# Patient Record
Sex: MALE | Race: OTHER | URBAN - METROPOLITAN AREA
[De-identification: names, ages, dates, MRNs, and addresses within clinical notes are randomized per-mention and may not be internally consistent; named-entity substitution may affect disease eponyms.]

---

## 2023-02-13 ENCOUNTER — LAB ENCOUNTER (OUTPATIENT)
Dept: LAB | Age: 53
End: 2023-02-13
Attending: STUDENT IN AN ORGANIZED HEALTH CARE EDUCATION/TRAINING PROGRAM
Payer: COMMERCIAL

## 2023-02-13 ENCOUNTER — OFFICE VISIT (OUTPATIENT)
Dept: FAMILY MEDICINE CLINIC | Facility: CLINIC | Age: 53
End: 2023-02-13

## 2023-02-13 VITALS
SYSTOLIC BLOOD PRESSURE: 115 MMHG | DIASTOLIC BLOOD PRESSURE: 74 MMHG | WEIGHT: 157 LBS | BODY MASS INDEX: 22.48 KG/M2 | HEART RATE: 76 BPM | HEIGHT: 69.9 IN

## 2023-02-13 DIAGNOSIS — R10.11 RUQ PAIN: ICD-10-CM

## 2023-02-13 DIAGNOSIS — Z00.00 WELL ADULT EXAM: ICD-10-CM

## 2023-02-13 DIAGNOSIS — R10.11 RUQ PAIN: Primary | ICD-10-CM

## 2023-02-13 LAB
ALBUMIN SERPL-MCNC: 4.1 G/DL (ref 3.4–5)
ALBUMIN/GLOB SERPL: 1.1 {RATIO} (ref 1–2)
ALP LIVER SERPL-CCNC: 89 U/L
ALT SERPL-CCNC: 24 U/L
ANION GAP SERPL CALC-SCNC: 6 MMOL/L (ref 0–18)
AST SERPL-CCNC: 13 U/L (ref 15–37)
BILIRUB SERPL-MCNC: 0.7 MG/DL (ref 0.1–2)
BILIRUB UR QL: NEGATIVE
BUN BLD-MCNC: 13 MG/DL (ref 7–18)
BUN/CREAT SERPL: 14.1 (ref 10–20)
CALCIUM BLD-MCNC: 9.1 MG/DL (ref 8.5–10.1)
CHLORIDE SERPL-SCNC: 107 MMOL/L (ref 98–112)
CHOLEST SERPL-MCNC: 150 MG/DL (ref ?–200)
CLARITY UR: CLEAR
CO2 SERPL-SCNC: 27 MMOL/L (ref 21–32)
COLOR UR: YELLOW
COMPLEXED PSA SERPL-MCNC: 1.01 NG/ML (ref ?–4)
CREAT BLD-MCNC: 0.92 MG/DL
DEPRECATED RDW RBC AUTO: 41.1 FL (ref 35.1–46.3)
ERYTHROCYTE [DISTWIDTH] IN BLOOD BY AUTOMATED COUNT: 12.4 % (ref 11–15)
FASTING PATIENT LIPID ANSWER: NO
FASTING STATUS PATIENT QL REPORTED: NO
GFR SERPLBLD BASED ON 1.73 SQ M-ARVRAT: 101 ML/MIN/1.73M2 (ref 60–?)
GLOBULIN PLAS-MCNC: 3.6 G/DL (ref 2.8–4.4)
GLUCOSE BLD-MCNC: 91 MG/DL (ref 70–99)
GLUCOSE UR-MCNC: NEGATIVE MG/DL
HCT VFR BLD AUTO: 45.9 %
HDLC SERPL-MCNC: 51 MG/DL (ref 40–59)
HGB BLD-MCNC: 15.8 G/DL
KETONES UR-MCNC: NEGATIVE MG/DL
LDLC SERPL CALC-MCNC: 86 MG/DL (ref ?–100)
LEUKOCYTE ESTERASE UR QL STRIP.AUTO: NEGATIVE
LIPASE SERPL-CCNC: 147 U/L (ref 73–393)
LIPASE SERPL-CCNC: 33 U/L (ref 13–75)
MCH RBC QN AUTO: 31 PG (ref 26–34)
MCHC RBC AUTO-ENTMCNC: 34.4 G/DL (ref 31–37)
MCV RBC AUTO: 90.2 FL
NITRITE UR QL STRIP.AUTO: NEGATIVE
NONHDLC SERPL-MCNC: 99 MG/DL (ref ?–130)
OSMOLALITY SERPL CALC.SUM OF ELEC: 290 MOSM/KG (ref 275–295)
PH UR: 6.5 [PH] (ref 5–8)
PLATELET # BLD AUTO: 280 10(3)UL (ref 150–450)
POTASSIUM SERPL-SCNC: 3.8 MMOL/L (ref 3.5–5.1)
PROT SERPL-MCNC: 7.7 G/DL (ref 6.4–8.2)
PROT UR-MCNC: NEGATIVE MG/DL
RBC # BLD AUTO: 5.09 X10(6)UL
SODIUM SERPL-SCNC: 140 MMOL/L (ref 136–145)
SP GR UR STRIP: 1.02 (ref 1–1.03)
TRIGL SERPL-MCNC: 66 MG/DL (ref 30–149)
TSI SER-ACNC: 1.48 MIU/ML (ref 0.36–3.74)
UROBILINOGEN UR STRIP-ACNC: 1
VLDLC SERPL CALC-MCNC: 10 MG/DL (ref 0–30)
WBC # BLD AUTO: 6.7 X10(3) UL (ref 4–11)

## 2023-02-13 PROCEDURE — 81015 MICROSCOPIC EXAM OF URINE: CPT

## 2023-02-13 PROCEDURE — 85027 COMPLETE CBC AUTOMATED: CPT

## 2023-02-13 PROCEDURE — 80053 COMPREHEN METABOLIC PANEL: CPT

## 2023-02-13 PROCEDURE — 83690 ASSAY OF LIPASE: CPT

## 2023-02-13 PROCEDURE — 84443 ASSAY THYROID STIM HORMONE: CPT

## 2023-02-13 PROCEDURE — 80061 LIPID PANEL: CPT

## 2023-02-13 PROCEDURE — 36415 COLL VENOUS BLD VENIPUNCTURE: CPT

## 2023-02-13 PROCEDURE — 81001 URINALYSIS AUTO W/SCOPE: CPT

## 2023-02-13 RX ORDER — TRAMADOL HYDROCHLORIDE 50 MG/1
50 TABLET ORAL EVERY 6 HOURS PRN
Qty: 30 TABLET | Refills: 1 | Status: SHIPPED | OUTPATIENT
Start: 2023-02-13

## 2023-02-22 ENCOUNTER — PATIENT MESSAGE (OUTPATIENT)
Dept: FAMILY MEDICINE CLINIC | Facility: CLINIC | Age: 53
End: 2023-02-22

## 2023-02-22 ENCOUNTER — MED REC SCAN ONLY (OUTPATIENT)
Dept: FAMILY MEDICINE CLINIC | Facility: CLINIC | Age: 53
End: 2023-02-22

## 2023-02-22 DIAGNOSIS — N28.9 RENAL LESION: Primary | ICD-10-CM

## 2023-03-03 ENCOUNTER — TELEPHONE (OUTPATIENT)
Dept: FAMILY MEDICINE CLINIC | Facility: CLINIC | Age: 53
End: 2023-03-03

## 2023-03-03 NOTE — TELEPHONE ENCOUNTER
Faxed CT Abdomen to [facility] per patients request.    Notified patient using Italian language line  Nicole # [id number]

## 2023-03-03 NOTE — TELEPHONE ENCOUNTER
Pt states that he has an appointment with [facility] tomorrow at 11:30. Pt would like confirm that the order/ct abdomen was sent to them. Please, see previous encounter of 2-22-23. Pt would like a call back today  in Maltese to confirm that it was faxed.

## 2023-03-13 DIAGNOSIS — R10.11 RUQ PAIN: ICD-10-CM

## 2023-03-14 NOTE — TELEPHONE ENCOUNTER
Please review refill protocol failed/ no protocol  Requested Prescriptions   Pending Prescriptions Disp Refills    traMADol 50 MG Oral Tab 30 tablet 1     Sig: Take 1 tablet (50 mg total) by mouth every 6 (six) hours as needed for Pain.        There is no refill protocol information for this order

## 2023-03-16 RX ORDER — TRAMADOL HYDROCHLORIDE 50 MG/1
50 TABLET ORAL EVERY 6 HOURS PRN
Qty: 30 TABLET | Refills: 1 | Status: SHIPPED | OUTPATIENT
Start: 2023-03-16

## 2023-03-22 ENCOUNTER — MED REC SCAN ONLY (OUTPATIENT)
Dept: FAMILY MEDICINE CLINIC | Facility: CLINIC | Age: 53
End: 2023-03-22

## 2023-03-27 ENCOUNTER — OFFICE VISIT (OUTPATIENT)
Dept: FAMILY MEDICINE CLINIC | Facility: CLINIC | Age: 53
End: 2023-03-27

## 2023-03-27 VITALS
WEIGHT: 152 LBS | SYSTOLIC BLOOD PRESSURE: 127 MMHG | BODY MASS INDEX: 21.76 KG/M2 | HEART RATE: 76 BPM | DIASTOLIC BLOOD PRESSURE: 80 MMHG | HEIGHT: 69.9 IN | TEMPERATURE: 98 F

## 2023-03-27 DIAGNOSIS — S33.5XXA SPRAIN OF LOW BACK, INITIAL ENCOUNTER: Primary | ICD-10-CM

## 2023-03-27 PROCEDURE — 3008F BODY MASS INDEX DOCD: CPT | Performed by: STUDENT IN AN ORGANIZED HEALTH CARE EDUCATION/TRAINING PROGRAM

## 2023-03-27 PROCEDURE — 99213 OFFICE O/P EST LOW 20 MIN: CPT | Performed by: STUDENT IN AN ORGANIZED HEALTH CARE EDUCATION/TRAINING PROGRAM

## 2023-03-27 PROCEDURE — 3079F DIAST BP 80-89 MM HG: CPT | Performed by: STUDENT IN AN ORGANIZED HEALTH CARE EDUCATION/TRAINING PROGRAM

## 2023-03-27 PROCEDURE — 3074F SYST BP LT 130 MM HG: CPT | Performed by: STUDENT IN AN ORGANIZED HEALTH CARE EDUCATION/TRAINING PROGRAM

## 2023-03-27 RX ORDER — ORPHENADRINE CITRATE 100 MG/1
100 TABLET, EXTENDED RELEASE ORAL
COMMUNITY
Start: 2023-03-22 | End: 2023-03-27

## 2023-03-27 RX ORDER — KETOROLAC TROMETHAMINE 10 MG/1
TABLET, FILM COATED ORAL
COMMUNITY
Start: 2023-03-22

## 2023-04-17 ENCOUNTER — TELEPHONE (OUTPATIENT)
Dept: PHYSICAL THERAPY | Facility: HOSPITAL | Age: 53
End: 2023-04-17

## 2023-04-19 ENCOUNTER — OFFICE VISIT (OUTPATIENT)
Dept: PHYSICAL THERAPY | Facility: HOSPITAL | Age: 53
End: 2023-04-19
Attending: STUDENT IN AN ORGANIZED HEALTH CARE EDUCATION/TRAINING PROGRAM
Payer: COMMERCIAL

## 2023-04-19 DIAGNOSIS — S33.5XXA SPRAIN OF LOW BACK, INITIAL ENCOUNTER: ICD-10-CM

## 2023-04-19 PROCEDURE — 97161 PT EVAL LOW COMPLEX 20 MIN: CPT

## 2023-04-19 PROCEDURE — 97110 THERAPEUTIC EXERCISES: CPT

## 2023-04-25 ENCOUNTER — OFFICE VISIT (OUTPATIENT)
Dept: PHYSICAL THERAPY | Facility: HOSPITAL | Age: 53
End: 2023-04-25
Attending: STUDENT IN AN ORGANIZED HEALTH CARE EDUCATION/TRAINING PROGRAM
Payer: COMMERCIAL

## 2023-04-25 PROCEDURE — 97110 THERAPEUTIC EXERCISES: CPT

## 2023-04-25 PROCEDURE — 97140 MANUAL THERAPY 1/> REGIONS: CPT

## 2023-04-25 PROCEDURE — 97112 NEUROMUSCULAR REEDUCATION: CPT

## 2023-04-27 ENCOUNTER — OFFICE VISIT (OUTPATIENT)
Dept: PHYSICAL THERAPY | Facility: HOSPITAL | Age: 53
End: 2023-04-27
Attending: STUDENT IN AN ORGANIZED HEALTH CARE EDUCATION/TRAINING PROGRAM
Payer: COMMERCIAL

## 2023-04-27 PROCEDURE — 97140 MANUAL THERAPY 1/> REGIONS: CPT

## 2023-04-27 PROCEDURE — 97110 THERAPEUTIC EXERCISES: CPT

## 2023-05-01 ENCOUNTER — OFFICE VISIT (OUTPATIENT)
Dept: PHYSICAL THERAPY | Facility: HOSPITAL | Age: 53
End: 2023-05-01
Attending: STUDENT IN AN ORGANIZED HEALTH CARE EDUCATION/TRAINING PROGRAM
Payer: COMMERCIAL

## 2023-05-01 PROCEDURE — 97112 NEUROMUSCULAR REEDUCATION: CPT

## 2023-05-01 PROCEDURE — 97110 THERAPEUTIC EXERCISES: CPT

## 2023-05-03 ENCOUNTER — OFFICE VISIT (OUTPATIENT)
Dept: PHYSICAL THERAPY | Facility: HOSPITAL | Age: 53
End: 2023-05-03
Attending: STUDENT IN AN ORGANIZED HEALTH CARE EDUCATION/TRAINING PROGRAM
Payer: COMMERCIAL

## 2023-05-03 PROCEDURE — 97112 NEUROMUSCULAR REEDUCATION: CPT

## 2023-05-03 PROCEDURE — 97110 THERAPEUTIC EXERCISES: CPT

## 2023-05-08 ENCOUNTER — OFFICE VISIT (OUTPATIENT)
Dept: PHYSICAL THERAPY | Facility: HOSPITAL | Age: 53
End: 2023-05-08
Attending: STUDENT IN AN ORGANIZED HEALTH CARE EDUCATION/TRAINING PROGRAM
Payer: COMMERCIAL

## 2023-05-08 ENCOUNTER — TELEPHONE (OUTPATIENT)
Dept: PHYSICAL THERAPY | Facility: HOSPITAL | Age: 53
End: 2023-05-08

## 2023-05-08 PROCEDURE — 97112 NEUROMUSCULAR REEDUCATION: CPT

## 2023-05-08 PROCEDURE — 97110 THERAPEUTIC EXERCISES: CPT

## 2023-05-10 ENCOUNTER — OFFICE VISIT (OUTPATIENT)
Dept: PHYSICAL THERAPY | Facility: HOSPITAL | Age: 53
End: 2023-05-10
Attending: STUDENT IN AN ORGANIZED HEALTH CARE EDUCATION/TRAINING PROGRAM
Payer: COMMERCIAL

## 2023-05-10 PROCEDURE — 97110 THERAPEUTIC EXERCISES: CPT

## 2023-05-10 PROCEDURE — 97112 NEUROMUSCULAR REEDUCATION: CPT

## 2023-05-22 ENCOUNTER — OFFICE VISIT (OUTPATIENT)
Dept: FAMILY MEDICINE CLINIC | Facility: CLINIC | Age: 53
End: 2023-05-22

## 2023-05-22 VITALS
HEIGHT: 69.9 IN | TEMPERATURE: 98 F | SYSTOLIC BLOOD PRESSURE: 113 MMHG | HEART RATE: 62 BPM | WEIGHT: 159.19 LBS | DIASTOLIC BLOOD PRESSURE: 74 MMHG | OXYGEN SATURATION: 97 % | BODY MASS INDEX: 22.79 KG/M2

## 2023-05-22 DIAGNOSIS — S33.5XXA SPRAIN OF LOW BACK, INITIAL ENCOUNTER: ICD-10-CM

## 2023-05-22 DIAGNOSIS — Z12.11 COLON CANCER SCREENING: ICD-10-CM

## 2023-05-22 DIAGNOSIS — Z00.00 WELL ADULT EXAM: Primary | ICD-10-CM

## 2023-05-22 PROCEDURE — 99396 PREV VISIT EST AGE 40-64: CPT | Performed by: STUDENT IN AN ORGANIZED HEALTH CARE EDUCATION/TRAINING PROGRAM

## 2023-05-22 PROCEDURE — 3074F SYST BP LT 130 MM HG: CPT | Performed by: STUDENT IN AN ORGANIZED HEALTH CARE EDUCATION/TRAINING PROGRAM

## 2023-05-22 PROCEDURE — 3008F BODY MASS INDEX DOCD: CPT | Performed by: STUDENT IN AN ORGANIZED HEALTH CARE EDUCATION/TRAINING PROGRAM

## 2023-05-22 PROCEDURE — 3078F DIAST BP <80 MM HG: CPT | Performed by: STUDENT IN AN ORGANIZED HEALTH CARE EDUCATION/TRAINING PROGRAM

## 2023-07-12 ENCOUNTER — OFFICE VISIT (OUTPATIENT)
Dept: FAMILY MEDICINE CLINIC | Facility: CLINIC | Age: 53
End: 2023-07-12

## 2023-07-12 VITALS
SYSTOLIC BLOOD PRESSURE: 115 MMHG | BODY MASS INDEX: 24.26 KG/M2 | HEART RATE: 69 BPM | DIASTOLIC BLOOD PRESSURE: 79 MMHG | OXYGEN SATURATION: 98 % | TEMPERATURE: 97 F | WEIGHT: 163.81 LBS | HEIGHT: 69 IN

## 2023-07-12 DIAGNOSIS — S33.5XXA SPRAIN OF LOW BACK, INITIAL ENCOUNTER: Primary | ICD-10-CM

## 2023-07-12 DIAGNOSIS — R10.11 RUQ PAIN: ICD-10-CM

## 2023-07-12 PROCEDURE — 99213 OFFICE O/P EST LOW 20 MIN: CPT | Performed by: STUDENT IN AN ORGANIZED HEALTH CARE EDUCATION/TRAINING PROGRAM

## 2023-07-12 RX ORDER — METHOCARBAMOL 750 MG/1
750 TABLET, FILM COATED ORAL 4 TIMES DAILY PRN
Qty: 60 TABLET | Refills: 0 | Status: SHIPPED | OUTPATIENT
Start: 2023-07-12

## 2023-07-12 RX ORDER — TRAMADOL HYDROCHLORIDE 50 MG/1
50 TABLET ORAL EVERY 6 HOURS PRN
Qty: 30 TABLET | Refills: 1 | Status: SHIPPED | OUTPATIENT
Start: 2023-07-12

## 2024-02-22 ENCOUNTER — OFFICE VISIT (OUTPATIENT)
Dept: FAMILY MEDICINE CLINIC | Facility: CLINIC | Age: 54
End: 2024-02-22
Payer: COMMERCIAL

## 2024-02-22 VITALS
HEART RATE: 66 BPM | HEIGHT: 69 IN | WEIGHT: 172 LBS | SYSTOLIC BLOOD PRESSURE: 128 MMHG | BODY MASS INDEX: 25.48 KG/M2 | OXYGEN SATURATION: 100 % | DIASTOLIC BLOOD PRESSURE: 84 MMHG

## 2024-02-22 DIAGNOSIS — H11.31 SCLERAL HEMORRHAGE OF RIGHT EYE: Primary | ICD-10-CM

## 2024-02-22 PROCEDURE — 3008F BODY MASS INDEX DOCD: CPT | Performed by: FAMILY MEDICINE

## 2024-02-22 PROCEDURE — 3074F SYST BP LT 130 MM HG: CPT | Performed by: FAMILY MEDICINE

## 2024-02-22 PROCEDURE — 3079F DIAST BP 80-89 MM HG: CPT | Performed by: FAMILY MEDICINE

## 2024-02-22 PROCEDURE — 99213 OFFICE O/P EST LOW 20 MIN: CPT | Performed by: FAMILY MEDICINE

## 2024-02-22 NOTE — PROGRESS NOTES
Subjective:   Dada Pratt is a 54 year old male who presents for Redness (Right eye redness that appeared Friday after 2:00PM after a coworker noticed it. Pt denies any pain. ) no trauma no vision change      History/Other:    Chief Complaint Reviewed and Verified  Nursing Notes Reviewed and   Verified  Tobacco Reviewed  Allergies Reviewed  Problem List Reviewed    Medical History Reviewed  Surgical History Reviewed  Family History   Reviewed  Social History Reviewed         Tobacco:  He smoked tobacco in the past but quit greater than 12 months ago.  Social History    Tobacco Use      Smoking status: Former        Packs/day: 0.50        Years: 14.00        Additional pack years: 0.00        Total pack years: 7.00        Types: Cigarettes        Quit date: 12/15/2008        Years since quitting: 15.1        Passive exposure: Never      Smokeless tobacco: Never       Current Outpatient Medications   Medication Sig Dispense Refill    methocarbamol 750 MG Oral Tab Take 1 tablet (750 mg total) by mouth 4 (four) times daily as needed (pain). (Patient not taking: Reported on 2/22/2024) 60 tablet 0    traMADol 50 MG Oral Tab Take 1 tablet (50 mg total) by mouth every 6 (six) hours as needed for Pain. (Patient not taking: Reported on 2/22/2024) 30 tablet 1    Ketorolac Tromethamine 10 MG Oral Tab TAKE 1 TABLET BY MOUTH EVERY 6 HOURS FOR UP TO 5 DAYS AS NEEDED FOR PAIN (Patient not taking: Reported on 5/22/2023)           Review of Systems:  Review of Systems   Constitutional: Negative.    Eyes:  Positive for redness. Negative for photophobia, pain, discharge, itching and visual disturbance.         Objective:   /84   Pulse 66   Ht 5' 9\" (1.753 m)   Wt 172 lb (78 kg)   SpO2 100%   BMI 25.40 kg/m²  Estimated body mass index is 25.4 kg/m² as calculated from the following:    Height as of this encounter: 5' 9\" (1.753 m).    Weight as of this encounter: 172 lb (78 kg).  Physical Exam  Vitals reviewed.    Constitutional:       Appearance: Normal appearance.   Eyes:      General:         Right eye: No discharge.      Slit lamp exam:     Right eye: Anterior chamber quiet.      Comments: Right scleral hemorrhage.     Neurological:      Mental Status: He is alert.           Assessment & Plan:   1. Scleral hemorrhage of right eye (Primary)    Assurance provided that this should heal without treatment.       No follow-ups on file.    Adelso Gonzalez DO, 2/22/2024, 12:08 PM

## 2024-06-07 ENCOUNTER — TELEPHONE (OUTPATIENT)
Dept: FAMILY MEDICINE CLINIC | Facility: CLINIC | Age: 54
End: 2024-06-07

## 2024-08-05 ENCOUNTER — HOSPITAL ENCOUNTER (INPATIENT)
Facility: HOSPITAL | Age: 54
LOS: 1 days | Discharge: HOME | End: 2024-08-07
Attending: EMERGENCY MEDICINE | Admitting: STUDENT IN AN ORGANIZED HEALTH CARE EDUCATION/TRAINING PROGRAM
Payer: COMMERCIAL

## 2024-08-05 ENCOUNTER — APPOINTMENT (OUTPATIENT)
Dept: CT IMAGING | Facility: HOSPITAL | Age: 54
End: 2024-08-05
Attending: EMERGENCY MEDICINE
Payer: COMMERCIAL

## 2024-08-05 DIAGNOSIS — A41.9 SEVERE SEPSIS (HCC): ICD-10-CM

## 2024-08-05 DIAGNOSIS — R65.20 SEVERE SEPSIS (HCC): ICD-10-CM

## 2024-08-05 DIAGNOSIS — K37 APPENDICITIS: ICD-10-CM

## 2024-08-05 DIAGNOSIS — K35.30 ACUTE APPENDICITIS WITH LOCALIZED PERITONITIS, WITHOUT PERFORATION, ABSCESS, OR GANGRENE: Primary | ICD-10-CM

## 2024-08-05 DIAGNOSIS — K37 APPENDICITIS: Primary | ICD-10-CM

## 2024-08-05 LAB
ALBUMIN SERPL-MCNC: 4.5 G/DL (ref 3.2–4.8)
ALBUMIN/GLOB SERPL: 1.5 {RATIO} (ref 1–2)
ALP LIVER SERPL-CCNC: 83 U/L
ALT SERPL-CCNC: 17 U/L
ANION GAP SERPL CALC-SCNC: 8 MMOL/L (ref 0–18)
AST SERPL-CCNC: 22 U/L (ref ?–34)
BASOPHILS # BLD AUTO: 0.04 X10(3) UL (ref 0–0.2)
BASOPHILS NFR BLD AUTO: 0.3 %
BILIRUB SERPL-MCNC: 1.1 MG/DL (ref 0.3–1.2)
BILIRUB UR QL: NEGATIVE
BUN BLD-MCNC: 8 MG/DL (ref 9–23)
BUN/CREAT SERPL: 8.5 (ref 10–20)
CALCIUM BLD-MCNC: 9.5 MG/DL (ref 8.7–10.4)
CHLORIDE SERPL-SCNC: 107 MMOL/L (ref 98–112)
CLARITY UR: CLEAR
CO2 SERPL-SCNC: 23 MMOL/L (ref 21–32)
COLOR UR: COLORLESS
CREAT BLD-MCNC: 0.94 MG/DL
DEPRECATED RDW RBC AUTO: 42.2 FL (ref 35.1–46.3)
EGFRCR SERPLBLD CKD-EPI 2021: 98 ML/MIN/1.73M2 (ref 60–?)
EOSINOPHIL # BLD AUTO: 0.06 X10(3) UL (ref 0–0.7)
EOSINOPHIL NFR BLD AUTO: 0.4 %
ERYTHROCYTE [DISTWIDTH] IN BLOOD BY AUTOMATED COUNT: 12.6 % (ref 11–15)
GLOBULIN PLAS-MCNC: 3 G/DL (ref 2–3.5)
GLUCOSE BLD-MCNC: 116 MG/DL (ref 70–99)
GLUCOSE UR-MCNC: NORMAL MG/DL
HCT VFR BLD AUTO: 44.4 %
HGB BLD-MCNC: 15.4 G/DL
HGB UR QL STRIP.AUTO: NEGATIVE
IMM GRANULOCYTES # BLD AUTO: 0.05 X10(3) UL (ref 0–1)
IMM GRANULOCYTES NFR BLD: 0.4 %
KETONES UR-MCNC: 10 MG/DL
LACTATE SERPL-SCNC: 1.7 MMOL/L (ref 0.5–2)
LEUKOCYTE ESTERASE UR QL STRIP.AUTO: NEGATIVE
LYMPHOCYTES # BLD AUTO: 1.64 X10(3) UL (ref 1–4)
LYMPHOCYTES NFR BLD AUTO: 12.2 %
MAGNESIUM SERPL-MCNC: 2 MG/DL (ref 1.6–2.6)
MCH RBC QN AUTO: 31.6 PG (ref 26–34)
MCHC RBC AUTO-ENTMCNC: 34.7 G/DL (ref 31–37)
MCV RBC AUTO: 91.2 FL
MONOCYTES # BLD AUTO: 0.85 X10(3) UL (ref 0.1–1)
MONOCYTES NFR BLD AUTO: 6.3 %
NEUTROPHILS # BLD AUTO: 10.79 X10 (3) UL (ref 1.5–7.7)
NEUTROPHILS # BLD AUTO: 10.79 X10(3) UL (ref 1.5–7.7)
NEUTROPHILS NFR BLD AUTO: 80.4 %
NITRITE UR QL STRIP.AUTO: NEGATIVE
OSMOLALITY SERPL CALC.SUM OF ELEC: 285 MOSM/KG (ref 275–295)
PH UR: 6.5 [PH] (ref 5–8)
PLATELET # BLD AUTO: 242 10(3)UL (ref 150–450)
POTASSIUM SERPL-SCNC: 3.9 MMOL/L (ref 3.5–5.1)
PROT SERPL-MCNC: 7.5 G/DL (ref 5.7–8.2)
PROT UR-MCNC: NEGATIVE MG/DL
RBC # BLD AUTO: 4.87 X10(6)UL
SODIUM SERPL-SCNC: 138 MMOL/L (ref 136–145)
SP GR UR STRIP: 1.02 (ref 1–1.03)
UROBILINOGEN UR STRIP-ACNC: NORMAL
WBC # BLD AUTO: 13.4 X10(3) UL (ref 4–11)

## 2024-08-05 PROCEDURE — 99223 1ST HOSP IP/OBS HIGH 75: CPT | Performed by: STUDENT IN AN ORGANIZED HEALTH CARE EDUCATION/TRAINING PROGRAM

## 2024-08-05 PROCEDURE — 74177 CT ABD & PELVIS W/CONTRAST: CPT | Performed by: EMERGENCY MEDICINE

## 2024-08-05 RX ORDER — MORPHINE SULFATE 4 MG/ML
4 INJECTION, SOLUTION INTRAMUSCULAR; INTRAVENOUS ONCE
Status: COMPLETED | OUTPATIENT
Start: 2024-08-05 | End: 2024-08-05

## 2024-08-05 NOTE — ED INITIAL ASSESSMENT (HPI)
Pt c/o right lower abd pain that is radiating to right flank that started yesterday night. Pt denies N/V/D, fever, cough and urinary sx.

## 2024-08-06 ENCOUNTER — ANESTHESIA (OUTPATIENT)
Dept: SURGERY | Facility: HOSPITAL | Age: 54
End: 2024-08-06
Payer: COMMERCIAL

## 2024-08-06 ENCOUNTER — ANESTHESIA EVENT (OUTPATIENT)
Dept: SURGERY | Facility: HOSPITAL | Age: 54
End: 2024-08-06
Payer: COMMERCIAL

## 2024-08-06 PROBLEM — A41.9 SEVERE SEPSIS (HCC): Status: ACTIVE | Noted: 2024-08-06

## 2024-08-06 PROBLEM — K35.30 ACUTE APPENDICITIS WITH LOCALIZED PERITONITIS, WITHOUT PERFORATION, ABSCESS, OR GANGRENE: Status: ACTIVE | Noted: 2024-08-06

## 2024-08-06 PROBLEM — K35.32 ACUTE APPENDICITIS WITH PERFORATION, LOCALIZED PERITONITIS, AND GANGRENE, WITHOUT ABSCESS: Status: ACTIVE | Noted: 2024-08-05

## 2024-08-06 PROBLEM — R65.20 SEVERE SEPSIS (HCC): Status: ACTIVE | Noted: 2024-08-06

## 2024-08-06 LAB
ANION GAP SERPL CALC-SCNC: 5 MMOL/L (ref 0–18)
ATRIAL RATE: 78 BPM
BASOPHILS # BLD AUTO: 0.03 X10(3) UL (ref 0–0.2)
BASOPHILS NFR BLD AUTO: 0.2 %
BUN BLD-MCNC: 7 MG/DL (ref 9–23)
BUN/CREAT SERPL: 8 (ref 10–20)
CALCIUM BLD-MCNC: 8.7 MG/DL (ref 8.7–10.4)
CHLORIDE SERPL-SCNC: 108 MMOL/L (ref 98–112)
CO2 SERPL-SCNC: 25 MMOL/L (ref 21–32)
CREAT BLD-MCNC: 0.88 MG/DL
DEPRECATED RDW RBC AUTO: 41.7 FL (ref 35.1–46.3)
EGFRCR SERPLBLD CKD-EPI 2021: 103 ML/MIN/1.73M2 (ref 60–?)
EOSINOPHIL # BLD AUTO: 0.01 X10(3) UL (ref 0–0.7)
EOSINOPHIL NFR BLD AUTO: 0.1 %
ERYTHROCYTE [DISTWIDTH] IN BLOOD BY AUTOMATED COUNT: 12.7 % (ref 11–15)
GLUCOSE BLD-MCNC: 120 MG/DL (ref 70–99)
HCT VFR BLD AUTO: 40.9 %
HGB BLD-MCNC: 14 G/DL
IMM GRANULOCYTES # BLD AUTO: 0.05 X10(3) UL (ref 0–1)
IMM GRANULOCYTES NFR BLD: 0.4 %
LYMPHOCYTES # BLD AUTO: 0.64 X10(3) UL (ref 1–4)
LYMPHOCYTES NFR BLD AUTO: 4.9 %
MCH RBC QN AUTO: 31.1 PG (ref 26–34)
MCHC RBC AUTO-ENTMCNC: 34.2 G/DL (ref 31–37)
MCV RBC AUTO: 90.9 FL
MONOCYTES # BLD AUTO: 0.69 X10(3) UL (ref 0.1–1)
MONOCYTES NFR BLD AUTO: 5.3 %
NEUTROPHILS # BLD AUTO: 11.66 X10 (3) UL (ref 1.5–7.7)
NEUTROPHILS # BLD AUTO: 11.66 X10(3) UL (ref 1.5–7.7)
NEUTROPHILS NFR BLD AUTO: 89.1 %
OSMOLALITY SERPL CALC.SUM OF ELEC: 285 MOSM/KG (ref 275–295)
P AXIS: 72 DEGREES
P-R INTERVAL: 152 MS
PLATELET # BLD AUTO: 201 10(3)UL (ref 150–450)
POTASSIUM SERPL-SCNC: 4.2 MMOL/L (ref 3.5–5.1)
Q-T INTERVAL: 384 MS
QRS DURATION: 78 MS
QTC CALCULATION (BEZET): 437 MS
R AXIS: 30 DEGREES
RBC # BLD AUTO: 4.5 X10(6)UL
SODIUM SERPL-SCNC: 138 MMOL/L (ref 136–145)
T AXIS: 63 DEGREES
VENTRICULAR RATE: 78 BPM
WBC # BLD AUTO: 13.1 X10(3) UL (ref 4–11)

## 2024-08-06 PROCEDURE — 0DTJ4ZZ RESECTION OF APPENDIX, PERCUTANEOUS ENDOSCOPIC APPROACH: ICD-10-PCS | Performed by: SURGERY

## 2024-08-06 PROCEDURE — 99254 IP/OBS CNSLTJ NEW/EST MOD 60: CPT | Performed by: SURGERY

## 2024-08-06 PROCEDURE — 99233 SBSQ HOSP IP/OBS HIGH 50: CPT | Performed by: INTERNAL MEDICINE

## 2024-08-06 PROCEDURE — 44970 LAPAROSCOPY APPENDECTOMY: CPT | Performed by: SURGERY

## 2024-08-06 RX ORDER — MORPHINE SULFATE 10 MG/ML
6 INJECTION, SOLUTION INTRAMUSCULAR; INTRAVENOUS EVERY 10 MIN PRN
Status: DISCONTINUED | OUTPATIENT
Start: 2024-08-06 | End: 2024-08-06 | Stop reason: HOSPADM

## 2024-08-06 RX ORDER — SODIUM PHOSPHATE, DIBASIC AND SODIUM PHOSPHATE, MONOBASIC 7; 19 G/230ML; G/230ML
1 ENEMA RECTAL ONCE AS NEEDED
Status: DISCONTINUED | OUTPATIENT
Start: 2024-08-06 | End: 2024-08-07

## 2024-08-06 RX ORDER — DEXAMETHASONE SODIUM PHOSPHATE 4 MG/ML
VIAL (ML) INJECTION AS NEEDED
Status: DISCONTINUED | OUTPATIENT
Start: 2024-08-06 | End: 2024-08-06 | Stop reason: SURG

## 2024-08-06 RX ORDER — MORPHINE SULFATE 2 MG/ML
2 INJECTION, SOLUTION INTRAMUSCULAR; INTRAVENOUS EVERY 2 HOUR PRN
Status: DISCONTINUED | OUTPATIENT
Start: 2024-08-06 | End: 2024-08-07

## 2024-08-06 RX ORDER — NEOSTIGMINE METHYLSULFATE 1 MG/ML
INJECTION INTRAVENOUS AS NEEDED
Status: DISCONTINUED | OUTPATIENT
Start: 2024-08-06 | End: 2024-08-06 | Stop reason: SURG

## 2024-08-06 RX ORDER — POLYETHYLENE GLYCOL 3350 17 G/17G
17 POWDER, FOR SOLUTION ORAL DAILY PRN
Status: DISCONTINUED | OUTPATIENT
Start: 2024-08-06 | End: 2024-08-07

## 2024-08-06 RX ORDER — ROCURONIUM BROMIDE 10 MG/ML
INJECTION, SOLUTION INTRAVENOUS AS NEEDED
Status: DISCONTINUED | OUTPATIENT
Start: 2024-08-06 | End: 2024-08-06 | Stop reason: SURG

## 2024-08-06 RX ORDER — HYDROMORPHONE HYDROCHLORIDE 1 MG/ML
0.6 INJECTION, SOLUTION INTRAMUSCULAR; INTRAVENOUS; SUBCUTANEOUS EVERY 5 MIN PRN
Status: DISCONTINUED | OUTPATIENT
Start: 2024-08-06 | End: 2024-08-06 | Stop reason: HOSPADM

## 2024-08-06 RX ORDER — HYDROMORPHONE HYDROCHLORIDE 1 MG/ML
0.4 INJECTION, SOLUTION INTRAMUSCULAR; INTRAVENOUS; SUBCUTANEOUS EVERY 5 MIN PRN
Status: DISCONTINUED | OUTPATIENT
Start: 2024-08-06 | End: 2024-08-06 | Stop reason: HOSPADM

## 2024-08-06 RX ORDER — PROCHLORPERAZINE EDISYLATE 5 MG/ML
5 INJECTION INTRAMUSCULAR; INTRAVENOUS EVERY 8 HOURS PRN
Status: DISCONTINUED | OUTPATIENT
Start: 2024-08-06 | End: 2024-08-07

## 2024-08-06 RX ORDER — HYDROMORPHONE HYDROCHLORIDE 1 MG/ML
0.4 INJECTION, SOLUTION INTRAMUSCULAR; INTRAVENOUS; SUBCUTANEOUS EVERY 2 HOUR PRN
Status: DISCONTINUED | OUTPATIENT
Start: 2024-08-06 | End: 2024-08-07

## 2024-08-06 RX ORDER — MORPHINE SULFATE 4 MG/ML
4 INJECTION, SOLUTION INTRAMUSCULAR; INTRAVENOUS EVERY 10 MIN PRN
Status: DISCONTINUED | OUTPATIENT
Start: 2024-08-06 | End: 2024-08-06 | Stop reason: HOSPADM

## 2024-08-06 RX ORDER — HYDROMORPHONE HYDROCHLORIDE 1 MG/ML
0.8 INJECTION, SOLUTION INTRAMUSCULAR; INTRAVENOUS; SUBCUTANEOUS EVERY 2 HOUR PRN
Status: DISCONTINUED | OUTPATIENT
Start: 2024-08-06 | End: 2024-08-07

## 2024-08-06 RX ORDER — SENNOSIDES 8.6 MG
17.2 TABLET ORAL NIGHTLY PRN
Status: DISCONTINUED | OUTPATIENT
Start: 2024-08-06 | End: 2024-08-06

## 2024-08-06 RX ORDER — LIDOCAINE HYDROCHLORIDE 10 MG/ML
INJECTION, SOLUTION EPIDURAL; INFILTRATION; INTRACAUDAL; PERINEURAL AS NEEDED
Status: DISCONTINUED | OUTPATIENT
Start: 2024-08-06 | End: 2024-08-06 | Stop reason: SURG

## 2024-08-06 RX ORDER — OXYCODONE HYDROCHLORIDE 5 MG/1
5 TABLET ORAL EVERY 4 HOURS PRN
Status: DISCONTINUED | OUTPATIENT
Start: 2024-08-06 | End: 2024-08-07

## 2024-08-06 RX ORDER — HEPARIN SODIUM 5000 [USP'U]/ML
5000 INJECTION, SOLUTION INTRAVENOUS; SUBCUTANEOUS EVERY 12 HOURS SCHEDULED
Status: DISCONTINUED | OUTPATIENT
Start: 2024-08-07 | End: 2024-08-07

## 2024-08-06 RX ORDER — NALOXONE HYDROCHLORIDE 0.4 MG/ML
0.08 INJECTION, SOLUTION INTRAMUSCULAR; INTRAVENOUS; SUBCUTANEOUS AS NEEDED
Status: DISCONTINUED | OUTPATIENT
Start: 2024-08-06 | End: 2024-08-06 | Stop reason: HOSPADM

## 2024-08-06 RX ORDER — ACETAMINOPHEN 500 MG
500 TABLET ORAL EVERY 4 HOURS PRN
Status: DISCONTINUED | OUTPATIENT
Start: 2024-08-06 | End: 2024-08-07

## 2024-08-06 RX ORDER — ONDANSETRON 2 MG/ML
INJECTION INTRAMUSCULAR; INTRAVENOUS AS NEEDED
Status: DISCONTINUED | OUTPATIENT
Start: 2024-08-06 | End: 2024-08-06 | Stop reason: SURG

## 2024-08-06 RX ORDER — ONDANSETRON 2 MG/ML
4 INJECTION INTRAMUSCULAR; INTRAVENOUS EVERY 6 HOURS PRN
Status: DISCONTINUED | OUTPATIENT
Start: 2024-08-06 | End: 2024-08-06

## 2024-08-06 RX ORDER — BUPIVACAINE HYDROCHLORIDE 5 MG/ML
INJECTION, SOLUTION EPIDURAL; INTRACAUDAL AS NEEDED
Status: DISCONTINUED | OUTPATIENT
Start: 2024-08-06 | End: 2024-08-06 | Stop reason: HOSPADM

## 2024-08-06 RX ORDER — SENNOSIDES 8.6 MG
17.2 TABLET ORAL NIGHTLY PRN
Status: DISCONTINUED | OUTPATIENT
Start: 2024-08-06 | End: 2024-08-07

## 2024-08-06 RX ORDER — SODIUM PHOSPHATE, DIBASIC AND SODIUM PHOSPHATE, MONOBASIC 7; 19 G/230ML; G/230ML
1 ENEMA RECTAL ONCE AS NEEDED
Status: DISCONTINUED | OUTPATIENT
Start: 2024-08-06 | End: 2024-08-06

## 2024-08-06 RX ORDER — BISACODYL 10 MG
10 SUPPOSITORY, RECTAL RECTAL
Status: DISCONTINUED | OUTPATIENT
Start: 2024-08-06 | End: 2024-08-07

## 2024-08-06 RX ORDER — MIDAZOLAM HYDROCHLORIDE 1 MG/ML
INJECTION INTRAMUSCULAR; INTRAVENOUS AS NEEDED
Status: DISCONTINUED | OUTPATIENT
Start: 2024-08-06 | End: 2024-08-06 | Stop reason: SURG

## 2024-08-06 RX ORDER — OXYCODONE HYDROCHLORIDE 5 MG/1
10 TABLET ORAL EVERY 4 HOURS PRN
Status: DISCONTINUED | OUTPATIENT
Start: 2024-08-06 | End: 2024-08-07

## 2024-08-06 RX ORDER — SODIUM CHLORIDE 9 MG/ML
INJECTION, SOLUTION INTRAVENOUS CONTINUOUS
Status: DISCONTINUED | OUTPATIENT
Start: 2024-08-06 | End: 2024-08-07

## 2024-08-06 RX ORDER — GLYCOPYRROLATE 0.2 MG/ML
INJECTION, SOLUTION INTRAMUSCULAR; INTRAVENOUS AS NEEDED
Status: DISCONTINUED | OUTPATIENT
Start: 2024-08-06 | End: 2024-08-06 | Stop reason: SURG

## 2024-08-06 RX ORDER — SODIUM CHLORIDE, SODIUM LACTATE, POTASSIUM CHLORIDE, CALCIUM CHLORIDE 600; 310; 30; 20 MG/100ML; MG/100ML; MG/100ML; MG/100ML
INJECTION, SOLUTION INTRAVENOUS CONTINUOUS
Status: DISCONTINUED | OUTPATIENT
Start: 2024-08-06 | End: 2024-08-06 | Stop reason: HOSPADM

## 2024-08-06 RX ORDER — MORPHINE SULFATE 2 MG/ML
1 INJECTION, SOLUTION INTRAMUSCULAR; INTRAVENOUS EVERY 2 HOUR PRN
Status: DISCONTINUED | OUTPATIENT
Start: 2024-08-06 | End: 2024-08-07

## 2024-08-06 RX ORDER — BISACODYL 10 MG
10 SUPPOSITORY, RECTAL RECTAL
Status: DISCONTINUED | OUTPATIENT
Start: 2024-08-06 | End: 2024-08-06

## 2024-08-06 RX ORDER — MORPHINE SULFATE 4 MG/ML
4 INJECTION, SOLUTION INTRAMUSCULAR; INTRAVENOUS EVERY 2 HOUR PRN
Status: DISCONTINUED | OUTPATIENT
Start: 2024-08-06 | End: 2024-08-07

## 2024-08-06 RX ORDER — ONDANSETRON 2 MG/ML
4 INJECTION INTRAMUSCULAR; INTRAVENOUS EVERY 6 HOURS PRN
Status: DISCONTINUED | OUTPATIENT
Start: 2024-08-06 | End: 2024-08-07

## 2024-08-06 RX ORDER — POLYETHYLENE GLYCOL 3350 17 G/17G
17 POWDER, FOR SOLUTION ORAL DAILY PRN
Status: DISCONTINUED | OUTPATIENT
Start: 2024-08-06 | End: 2024-08-06

## 2024-08-06 RX ORDER — SODIUM CHLORIDE, SODIUM LACTATE, POTASSIUM CHLORIDE, CALCIUM CHLORIDE 600; 310; 30; 20 MG/100ML; MG/100ML; MG/100ML; MG/100ML
INJECTION, SOLUTION INTRAVENOUS CONTINUOUS PRN
Status: DISCONTINUED | OUTPATIENT
Start: 2024-08-06 | End: 2024-08-06 | Stop reason: SURG

## 2024-08-06 RX ORDER — PROCHLORPERAZINE EDISYLATE 5 MG/ML
5 INJECTION INTRAMUSCULAR; INTRAVENOUS EVERY 8 HOURS PRN
Status: DISCONTINUED | OUTPATIENT
Start: 2024-08-06 | End: 2024-08-06

## 2024-08-06 RX ORDER — HYDROMORPHONE HYDROCHLORIDE 1 MG/ML
0.2 INJECTION, SOLUTION INTRAMUSCULAR; INTRAVENOUS; SUBCUTANEOUS EVERY 5 MIN PRN
Status: DISCONTINUED | OUTPATIENT
Start: 2024-08-06 | End: 2024-08-06 | Stop reason: HOSPADM

## 2024-08-06 RX ORDER — MORPHINE SULFATE 4 MG/ML
2 INJECTION, SOLUTION INTRAMUSCULAR; INTRAVENOUS EVERY 10 MIN PRN
Status: DISCONTINUED | OUTPATIENT
Start: 2024-08-06 | End: 2024-08-06 | Stop reason: HOSPADM

## 2024-08-06 RX ADMIN — ONDANSETRON 4 MG: 2 INJECTION INTRAMUSCULAR; INTRAVENOUS at 15:11:00

## 2024-08-06 RX ADMIN — NEOSTIGMINE METHYLSULFATE 3 MG: 1 INJECTION INTRAVENOUS at 15:10:00

## 2024-08-06 RX ADMIN — MIDAZOLAM HYDROCHLORIDE 2 MG: 1 INJECTION INTRAMUSCULAR; INTRAVENOUS at 14:27:00

## 2024-08-06 RX ADMIN — GLYCOPYRROLATE 0.4 MG: 0.2 INJECTION, SOLUTION INTRAMUSCULAR; INTRAVENOUS at 15:10:00

## 2024-08-06 RX ADMIN — ROCURONIUM BROMIDE 30 MG: 10 INJECTION, SOLUTION INTRAVENOUS at 14:27:00

## 2024-08-06 RX ADMIN — SODIUM CHLORIDE, SODIUM LACTATE, POTASSIUM CHLORIDE, CALCIUM CHLORIDE: 600; 310; 30; 20 INJECTION, SOLUTION INTRAVENOUS at 14:21:00

## 2024-08-06 RX ADMIN — LIDOCAINE HYDROCHLORIDE 50 MG: 10 INJECTION, SOLUTION EPIDURAL; INFILTRATION; INTRACAUDAL; PERINEURAL at 14:27:00

## 2024-08-06 RX ADMIN — SODIUM CHLORIDE, SODIUM LACTATE, POTASSIUM CHLORIDE, CALCIUM CHLORIDE: 600; 310; 30; 20 INJECTION, SOLUTION INTRAVENOUS at 15:24:00

## 2024-08-06 RX ADMIN — DEXAMETHASONE SODIUM PHOSPHATE 4 MG: 4 MG/ML VIAL (ML) INJECTION at 15:07:00

## 2024-08-06 NOTE — H&P
[facility]  part of Located within [facility]    History & Physical    Dada Pratt Patient Status:  Emergency    1970 MRN [id number]   Location [facility] EMERGENCY DEPARTMENT Attending David Meléndez*   Hosp Day # 0 PCP Becky Anand MD     Date:  2024  Date of Admission:  2024    Chief Complaint:  No chief complaint on file.      Assessment and Plan:    Acute appendicitis.  Without perforation/abscess. + Leukocytosis present. Fever is not present. Pain is present.  CT abdomen/pelvis consistent with appendicitis.  -General surgery consulted, Dr. Pineda notified, pending eval for possible appendectomy in the a.m.  -IV antibiotics: Zosyn  -Pain control with morphine as needed  -Follow white count  -Diet: N.p.o.    Hypotension  -Patient noted to be hypotensive on presentation with SBP 73, no history of hypertension/hypotension.  Not on any blood pressure medications.  -Treated per sepsis protocol in the ED with improvement of SBP.  Will continue IV fluid hydration at 100 cc/h.  -Received broad-spectrum antibiotics with Zosyn in the ED.  Will continue with Zosyn for now.  -Lactic acid/blood cultures currently pending    Prophylaxis  SCDs    CODE STATUS  Full    History of Present Illness:  Dada Pratt is a(n) 54 year old male, who presents for evaluation of abdominal pain since yesterday.  No significant past medical history.  Patient reports that he started experiencing right lower quadrant pain radiating to the back that started yesterday and it progressively gotten worse.  The pain is constant, 8 out of 10.  Not associated with nausea or vomiting.  He denies previous similar pain in the past.  Denies any fever or chills.  Denies any chest pain or shortness of breath.  Reports having normal bowel movements day of presentation.  No burning or difficulty with urination.  No history of kidney stones.  Prior to arrival he took tramadol without relief of the pain.  He also felt  dizzy.  Has not been able to eat or drink much today.  On presentation to the ED, initial vital signs reveal temp 97.9, heart rate 58, respiratory 20, blood pressure 73/43, SpO2 100% on room air.  CT abdomen pelvis reveals severe uncomplicated appendicitis, no abscess or perforation.  Patient was treated per sepsis protocol with 30 cc/kg of IV fluid for a total of 2.1 L IV fluid.  He was also given morphine 4 mg x 2, an additional 1 L IV fluid and Zosyn IV antibiotic x 1.  Patient was admitted under hospitalist service with consultation to general surgery.    History:  History reviewed. No pertinent past medical history.  History reviewed. No pertinent surgical history.  Family History   Problem Relation Age of Onset    Hypertension Father     Diabetes Father       reports that he quit smoking about 15 years ago. His smoking use included cigarettes. He started smoking about 29 years ago. He has a 7 pack-year smoking history. He has never been exposed to tobacco smoke. He has never used smokeless tobacco. He reports current alcohol use of about 1.0 standard drink of alcohol per week. He reports that he does not use drugs.    Allergies:  No Known Allergies    Home Medications:  Prior to Admission Medications   Prescriptions Last Dose Informant Patient Reported? Taking?   Ketorolac Tromethamine 10 MG Oral Tab   Yes No   Sig: TAKE 1 TABLET BY MOUTH EVERY 6 HOURS FOR UP TO 5 DAYS AS NEEDED FOR PAIN   Patient not taking: Reported on 5/22/2023   methocarbamol 750 MG Oral Tab   No No   Sig: Take 1 tablet (750 mg total) by mouth 4 (four) times daily as needed (pain).   Patient not taking: Reported on 2/22/2024   traMADol 50 MG Oral Tab   No No   Sig: Take 1 tablet (50 mg total) by mouth every 6 (six) hours as needed for Pain.   Patient not taking: Reported on 2/22/2024      Facility-Administered Medications: None       Review of Systems:  Constitutional: Negative  Eye:  Negative.  Ear/Nose/Mouth/Throat:   Negative.  Respiratory:  Negative  Cardiovascular: Negative  Gastrointestinal:   +Abdominal pain  Genitourinary:  Negative  Endocrine:  Negative.  Immunologic:  Negative.  Musculoskeletal:  Negative.  Integumentary:  Negative.  Neurologic:  Negative.  Psychiatric:  Negative.  ROS reviewed as documented in chart    Physical Exam:  Temp:  [97.9 °F (36.6 °C)-98.2 °F (36.8 °C)] 98.2 °F (36.8 °C)  Pulse:  [] 111  Resp:  [17-22] 22  BP: ()/(43-84) 135/79  SpO2:  [98 %-100 %] 98 %    General:  Alert and oriented.  Diffuse skin problem:  None.  Eye:  Pupils are equal, round and reactive to light, extraocular movements are intact, Normal conjunctiva.  HENT:  Normocephalic, oral mucosa is moist.  Head:  Normocephalic, atraumatic.  Neck:  Supple, non-tender, no carotid bruit, no jugular venous distention, no lymphadenopathy, no thyromegaly.  Respiratory:  Lungs are clear to auscultation, respirations are non-labored, breath sounds are equal, symmetrical chest wall expansion.  Cardiovascular:  Normal rate, regular rhythm, no murmur, no edema.  Gastrointestinal:  Soft, non-tender, non-distended, normal bowel sounds, no organomegaly.  Lymphatics:  No lymphadenopathy neck, axilla, groin.  Musculoskeletal: Normal range of motion.  normal strength.  Feet:  Normal pulses.  Neurologic:  Alert, oriented, no focal deficits, cranial nerves II-XII are grossly intact.  Cognition and Speech:  Oriented, speech clear and coherent.  Psychiatric:  Cooperative, appropriate mood & affect.      Laboratory Data:   Lab Results   Component Value Date    WBC 13.4 08/05/2024    HGB 15.4 08/05/2024    HCT 44.4 08/05/2024    .0 08/05/2024    CREATSERUM 0.94 08/05/2024    BUN 8 08/05/2024     08/05/2024    K 3.9 08/05/2024     08/05/2024    CO2 23.0 08/05/2024     08/05/2024    CA 9.5 08/05/2024    ALB 4.5 08/05/2024    ALKPHO 83 08/05/2024    BILT 1.1 08/05/2024    TP 7.5 08/05/2024    AST 22 08/05/2024    ALT 17  08/05/2024    MG 2.0 08/05/2024       Imaging:  CT ABDOMEN+PELVIS(CONTRAST ONLY)(CPT=74177)    Result Date: 8/5/2024  CONCLUSION:   Severe acute appendicitis without abscess or perforation    Dictated by (CST): Chandler Hickey MD on 8/05/2024 at 9:36 PM     Finalized by (CST): Chandler Hickey MD on 8/05/2024 at 9:39 PM            Primary care physician  Becky Anand MD    60 minutes spent on this admission - examining patient, obtaining history, reviewing previous medical records, going over test results/imaging and discussing plan of care. All questions answered.     Disposition  Clinical course will dictate outcome      Kathleen Subramanian MD  8/5/2024  10:24 PM      [facility]  part of [facility]      Sepsis Reassessment Note    /73 (BP Location: Right arm)   Pulse 93   Temp 98.8 °F (37.1 °C) (Oral)   Resp 18   Wt 157 lb 10.1 oz (71.5 kg)   SpO2 98%   BMI 23.28 kg/m²      I completed the sepsis reassessment at 0100    Cardiac:  Regularity: Regular  Rate: Normal  Heart Sounds: S1,S2    Lungs:   Right: Clear  Left: Clear    Peripheral Pulses:  Radial: Right 1+ or Left 1+      Capillary Refill:  <3 Secs    Skin:  Temp/Moisture: Warm and Dry  Color: Normal      Kathleen Subramanian MD  8/6/2024  6:39 AM

## 2024-08-06 NOTE — ED QUICK NOTES
Orders for admission, patient is aware of plan and ready to go upstairs. Any questions, please call ED RN pepe at extension [phone].     Patient Covid vaccination status: Fully vaccinated     COVID Test Ordered in ED: None    COVID Suspicion at Admission: N/A    Running Infusions:    sodium chloride 1,178 mL (08/05/24 5537)        Mental Status/LOC at time of transport: x4    Other pertinent information:   CIWA score: N/A   NIH score:  N/A

## 2024-08-06 NOTE — PAYOR COMM NOTE
--------------  ADMISSION REVIEW     Payor: [employer]  Subscriber #:  [id number]  Authorization Number: [id number]    Admit date: 8/6/24  Admit time: 12:57 AM       History   HPI  54-year-old male presents for evaluation for right hip pain, right lower abdominal pain since last night.  Pain was initially more mild and intermittent but is now constant and severe.  He is feeling nauseated lightheaded and diaphoretic.  He denies any fevers.  He denies any diarrhea, constipation, dysuria, hematuria.  ED Triage Vitals [08/05/24 1858]   BP (!) 73/43   Pulse 58   Resp 20   Temp 97.9 °F (36.6 °C)   Temp src Oral   SpO2 100 %   O2 Device None (Room air)   Abdominal:      General: Bowel sounds are normal.      Palpations: Abdomen is soft.      Tenderness: There is abdominal tenderness in the right lower quadrant. There is guarding.   Musculoskeletal:         General: Normal range of motion.      Cervical back: Normal range of motion.      Right lower leg: No edema.      Left lower leg: No edema.   Skin:     General: Skin is warm and dry.   Neurological:      General: No focal deficit present.      Mental Status: He is alert.     Labs Reviewed   COMP METABOLIC PANEL (14) - Abnormal; Notable for the following components:       Result Value    Glucose 116 (*)     BUN 8 (*)     BUN/CREA Ratio 8.5 (*)     All other components within normal limits   URINALYSIS WITH CULTURE REFLEX - Abnormal; Notable for the following components:    Urine Color Colorless (*)     Ketones Urine 10 (*)     All other components within normal limits   CBC W/ DIFFERENTIAL - Abnormal; Notable for the following components:    WBC 13.4 (*)     Neutrophil Absolute Prelim 10.79 (*)     Neutrophil Absolute 10.79 (*)      -------------------------------  Time: 08/05 2231  Value: CT ABDOMEN+PELVIS(CONTRAST ONLY)(CPT=74177)  Comment: Per my independent interpretation, patient's CT Abdomen and Pelvis demonstrates acute appendicitis.    Disposition and Plan      Clinical Impression:  1. Acute appendicitis with localized peritonitis, without perforation, abscess, or gangrene    2. Severe sepsis (HCC)       Disposition:  Admit  2024 11:21 pm     History & Physical           Dada Pratt Patient Status:  Emergency    1970 MRN [id number]   [facility] EMERGENCY DEPARTMENT Attending David Meléndez*   Hosp Day # 0 PCP Becky Anand MD      Date:  2024  Date of Admission:  2024     Chief Complaint:  No chief complaint on file.        Assessment and Plan:     Acute appendicitis.  Without perforation/abscess. + Leukocytosis present. Fever is not present. Pain is present.  CT abdomen/pelvis consistent with appendicitis.  -General surgery consulted, Dr. Pineda notified, pending eval for possible appendectomy in the a.m.  -IV antibiotics: Zosyn  -Pain control with morphine as needed  -Follow white count  -Diet: N.p.o.     Hypotension  -Patient noted to be hypotensive on presentation with SBP 73, no history of hypertension/hypotension.  Not on any blood pressure medications.  -Treated per sepsis protocol in the ED with improvement of SBP.  Will continue IV fluid hydration at 100 cc/h.  -Received broad-spectrum antibiotics with Zosyn in the ED.  Will continue with Zosyn for now.  -Lactic acid/blood cultures currently pending     Prophylaxis  SCDs      History of Present Illness:  Dada Pratt is a(n) 54 year old male, who presents for evaluation of abdominal pain since yesterday.  No significant past medical history.  Patient reports that he started experiencing right lower quadrant pain radiating to the back that started yesterday and it progressively gotten worse.  The pain is constant, 8 out of 10.  Not associated with nausea or vomiting.  He denies previous similar pain in the past.  Denies any fever or chills.  Denies any chest pain or shortness of breath.  Reports having normal bowel movements day of presentation.  No burning or  difficulty with urination.  No history of kidney stones.  Prior to arrival he took tramadol without relief of the pain.  He also felt dizzy.  Has not been able to eat or drink much today.  On presentation to the ED, initial vital signs reveal temp 97.9, heart rate 58, respiratory 20, blood pressure 73/43, SpO2 100% on room air.  CT abdomen pelvis reveals severe uncomplicated appendicitis, no abscess or perforation.  Patient was treated per sepsis protocol with 30 cc/kg of IV fluid for a total of 2.1 L IV fluid.  He was also given morphine 4 mg x 2, an additional 1 L IV fluid and Zosyn IV antibiotic x 1.  Patient was admitted under hospitalist service with consultation to general surgery.           8/6/24  Report of Consultation     Reason for Consultation:   Acute appendicitis with localized peritonitis, without perforation, abscess, or gangrene     History of Present Illness:   Patient is a 54 year old male who was admitted to the hospital for Acute appendicitis with localized peritonitis, without perforation, abscess, or gangrene:     Dada is a 53yo male with no pertinent past medical history presenting with one day of RLQ abdominal pain that has been worsening since presentation. He denies associated fever, chills, nausea, and vomiting. CT A/P consistent with acute appendicitis. WBC 13.1.  Lab Results   Component Value Date     WBC 13.1 (H) 08/06/2024     HGB 14.0 08/06/2024     HCT 40.9 08/06/2024     .0 08/06/2024     CREATSERUM 0.88 08/06/2024     BUN 7 (L) 08/06/2024      08/06/2024     K 4.2 08/06/2024      08/06/2024     CO2 25.0 08/06/2024      (H) 08/06/2024     CA 8.7 08/06/2024     ALB 4.5 08/05/2024     ALKPHO 83 08/05/2024     TP 7.5 08/05/2024     AST 22 08/05/2024     ALT 17 08/05/2024     Impression:     Acute appendicitis with localized peritonitis, without perforation, abscess, or gangrene     Severe sepsis (HCC)      Recommendations:  Pt seen. Labs, imaging, and exam  consistent with acute appendicitis. Plan laparoscopic appendectomy. Keep NPO, continue IV abx and pain control.       Operative Report      Preop Dx:  Appendicitis [K37]  Postop Dx:  Perforated appendicitis  Procedure:  Laparoscopic appendectomy     INDICATION:  Pt is a 54 year old male who with Appendicitis [K37] who is scheduled for a LAPAROSCOPIC APPENDECTOMY.    MEDICATIONS ADMINISTERED IN LAST 1 DAY:    HYDROmorphone (Dilaudid) 1 MG/ML injection 0.4 mg       Date Action Dose Route User    8/6/2024 1556 Given 0.5 mg Intravenous Gabino Smith RN       morphINE PF 2 MG/ML injection 2 mg       Date Action Dose Route User    8/6/2024 1028 Given 2 mg Intravenous Bertha Faith RN    8/6/2024 0606 Given 2 mg Intravenous Savana Swift RN          morphINE PF 4 MG/ML injection 4 mg       Date Action Dose Route User    8/5/2024 2024 Given 4 mg Intravenous Leigh Pruitt RN          morphINE PF 4 MG/ML injection 4 mg       Date Action Dose Route User    8/5/2024 2243 Given 4 mg Intravenous Bianka Mao RN     pantoprazole (Protonix) 40 mg in sodium chloride 0.9% PF 10 mL IV push       Date Action Dose Route User    8/6/2024 1021 Given 40 mg Intravenous Bertha Faith RN          piperacillin-tazobactam (Zosyn) 3.375 g in dextrose 5% 100 mL IVPB-ADDV       Date Action Dose Route User    8/6/2024 1346 New Bag 3.375 g Intravenous Bertha Faith RN    8/6/2024 0602 New Bag 3.375 g Intravenous Savana Swift RN          piperacillin-tazobactam (Zosyn) 4.5 g in dextrose 5% 100 mL IVPB-ADDV       Date Action Dose Route User    8/5/2024 2243 New Bag 4.5 g Intravenous Bianka Mao RN       Vitals (last day)       Date/Time Temp Pulse Resp BP SpO2 Weight O2 Device O2 Flow Rate (L/min) [facility]    08/06/24 0420 98.8 °F (37.1 °C) -- 18 122/73 98 % -- None (Room air) -- [place]    08/06/24 0139 -- 92 -- -- -- -- -- -- VK    08/06/24 0111 98.8 °F (37.1 °C) -- -- -- -- -- -- --     08/06/24 0057  99.6 °F (37.6 °C) 104 18 144/81 97 % 157 lb 10.1 oz (71.5 kg) None (Room air) -- [place]    08/05/24 2025 98.2 °F (36.8 °C) 88 18 139/80 100 % -- None (Room air) -- KR    08/05/24 1858 97.9 °F (36.6 °C) 58 20 73/43 100 % 160 lb (72.6 kg) None (Room air) -- AH

## 2024-08-06 NOTE — DISCHARGE INSTRUCTIONS
Home Care Instructions    LAPAROSCOPIC APPENDECTOMY    1. You have incisions with absorbable sutures underneath the skin so no suture removal are needed.      2. You can shower the day after surgery.  The incisions can get wet with water and soap.  Just pat your incisions dry after showering.  Avoid soaking in a bath tub for one week.  Avoid heavy lifting (greater than 10 lbs or anything heavier than a gallon of milk) for six weeks after surgery.    3. Be up and around when you are home.  The more you walk, the faster your recovery will be.    4. You may have an abdominal binder (medical girdle).  Wear it when you are up and moving around.  The bottom of the binder should cover a little of your hip bones to provide support to your lower abdomen.  Avoid wearing the binder too high as it may make your discomfort worse.    5. Take pain medications around the clock for the first few days after surgery regardless if you have pain or not.  The pain medications take about 30 minutes to work so if you wait until you experience pain, then you might be uncomfortable during those 30 minutes.    6.  A well known side effect of pain medication is constipation.  It is the number 1, 2, and 3 reason why patients call me after surgery.  Adequate hydration and stool softeners are ways to minimize the risk of constipation after surgery.  Drink a lot of fluid when you are at home.  Check your urine color.  If it's dark, then you are dehydrated and need to drink more water.  Take as many stool softeners (morning, noon, evening) as you need to have about one bowel movement a day.  Don't let four or five days go by without a bowel movement.  If that occurs, then you might need a rectal suppository or an enema to treat the constipation.    7.  You may drive when you are no longer taking prescription pain medications with narcotics.  If your degree of discomfort is minimal, extra strength tylenol alternating with ibuprofen could be used as  necessary.    8. Please contact the office ([phone]) to schedule a telephone visit approximately two weeks after surgery.  At that time, if there are any issues, then we will schedule an in person clinic visit.    9. Signs and symptoms of post-operative problems include abdominal pain associated with nausea and/or vomiting, fever, chills, excessive drainage or pain at the incision sites, leg swelling/pain, or chest pain. Contact our office immediately if these signs or symptoms occur.    10. If you have any problems or questions please contact me at any time day or night.  My cell phone number is [phone].

## 2024-08-06 NOTE — CONSULTS
[facility]  part of [facility]    Report of Consultation    Dada Pratt Patient Status:  Inpatient    1970 MRN [id number]   Location [facility] 4W/SW/SE Attending Minh Malcolm MD   Hosp Day # 0 PCP Becky Anand MD     Date of Admission:  2024  Date of Consult:  2024  Reason for Consultation:   Acute appendicitis with localized peritonitis, without perforation, abscess, or gangrene    History of Present Illness:   Patient is a 54 year old male who was admitted to the hospital for Acute appendicitis with localized peritonitis, without perforation, abscess, or gangrene:    Dada is a 53yo male with no pertinent past medical history presenting with one day of RLQ abdominal pain that has been worsening since presentation. He denies associated fever, chills, nausea, and vomiting. CT A/P consistent with acute appendicitis. WBC 13.1.    Past Medical History  History reviewed. No pertinent past medical history.    Past Surgical History  History reviewed. No pertinent surgical history.    Family History  Family History   Problem Relation Age of Onset    Hypertension Father     Diabetes Father        Social History  Social History     Socioeconomic History    Marital status: Single   Tobacco Use    Smoking status: Former     Current packs/day: 0.00     Average packs/day: 0.5 packs/day for 14.0 years (7.0 ttl pk-yrs)     Types: Cigarettes     Start date: 12/15/1994     Quit date: 12/15/2008     Years since quitting: 15.6     Passive exposure: Never    Smokeless tobacco: Never   Vaping Use    Vaping status: Never Used   Substance and Sexual Activity    Alcohol use: Yes     Alcohol/week: 1.0 standard drink of alcohol     Types: 1 Cans of beer per week     Comment: social    Drug use: Never     Social Determinants of Health     Food Insecurity: No Food Insecurity (2024)    Food Insecurity     Food Insecurity: Never true   Transportation Needs: No Transportation Needs  (8/6/2024)    Transportation Needs     Lack of Transportation: No   Housing Stability: Low Risk  (8/6/2024)    Housing Stability     Housing Instability: No           Current Medications:  Current Facility-Administered Medications   Medication Dose Route Frequency    sodium chloride 0.9% infusion   Intravenous Continuous    acetaminophen (Tylenol Extra Strength) tab 500 mg  500 mg Oral Q4H PRN    polyethylene glycol (PEG 3350) (Miralax) 17 g oral packet 17 g  17 g Oral Daily PRN    sennosides (Senokot) tab 17.2 mg  17.2 mg Oral Nightly PRN    bisacodyl (Dulcolax) 10 MG rectal suppository 10 mg  10 mg Rectal Daily PRN    fleet enema (Fleet) 7-19 GM/118ML rectal enema 133 mL  1 enema Rectal Once PRN    ondansetron (Zofran) 4 MG/2ML injection 4 mg  4 mg Intravenous Q6H PRN    prochlorperazine (Compazine) 10 MG/2ML injection 5 mg  5 mg Intravenous Q8H PRN    morphINE PF 2 MG/ML injection 1 mg  1 mg Intravenous Q2H PRN    Or    morphINE PF 2 MG/ML injection 2 mg  2 mg Intravenous Q2H PRN    Or    morphINE PF 4 MG/ML injection 4 mg  4 mg Intravenous Q2H PRN    piperacillin-tazobactam (Zosyn) 3.375 g in dextrose 5% 100 mL IVPB-ADDV  3.375 g Intravenous Q8H    pantoprazole (Protonix) 40 mg in sodium chloride 0.9% PF 10 mL IV push  40 mg Intravenous Daily     Medications Prior to Admission   Medication Sig    traMADol 50 MG Oral Tab Take 1 tablet (50 mg total) by mouth every 6 (six) hours as needed for Pain.    methocarbamol 750 MG Oral Tab Take 1 tablet (750 mg total) by mouth 4 (four) times daily as needed (pain). (Patient not taking: Reported on 2/22/2024)    Ketorolac Tromethamine 10 MG Oral Tab TAKE 1 TABLET BY MOUTH EVERY 6 HOURS FOR UP TO 5 DAYS AS NEEDED FOR PAIN (Patient not taking: Reported on 5/22/2023)       Allergies  No Known Allergies    Review of Systems:   Review of Systems   Constitutional:  Negative for chills, diaphoresis and fever.   Respiratory:  Negative for shortness of breath.    Cardiovascular:   Negative for chest pain.   Gastrointestinal:  Positive for abdominal pain. Negative for nausea, vomiting, diarrhea, constipation and abdominal distention.   Neurological:  Negative for weakness.        Physical Exam:   Vital Signs:  Blood pressure 124/83, pulse 86, temperature 98.3 °F (36.8 °C), temperature source Temporal, resp. rate 18, weight 157 lb 10.1 oz (71.5 kg), SpO2 99%.     Physical Exam  Vitals reviewed.   Constitutional:       General: He is not in acute distress.     Appearance: Normal appearance.   HENT:      Head: Normocephalic and atraumatic.      Right Ear: External ear normal.      Left Ear: External ear normal.      Nose: Nose normal.   Pulmonary:      Effort: Pulmonary effort is normal. No respiratory distress.   Abdominal:      General: There is no distension.      Palpations: Abdomen is soft.      Tenderness: There is abdominal tenderness (RLQ). There is no guarding or rebound.   Neurological:      Mental Status: He is alert and oriented to person, place, and time.   Psychiatric:         Mood and Affect: Mood normal.         Behavior: Behavior normal.         Thought Content: Thought content normal.         Judgment: Judgment normal.         Results:     Laboratory Data:  Lab Results   Component Value Date    WBC 13.1 (H) 08/06/2024    HGB 14.0 08/06/2024    HCT 40.9 08/06/2024    .0 08/06/2024    CREATSERUM 0.88 08/06/2024    BUN 7 (L) 08/06/2024     08/06/2024    K 4.2 08/06/2024     08/06/2024    CO2 25.0 08/06/2024     (H) 08/06/2024    CA 8.7 08/06/2024    ALB 4.5 08/05/2024    ALKPHO 83 08/05/2024    TP 7.5 08/05/2024    AST 22 08/05/2024    ALT 17 08/05/2024    TSH 1.480 02/13/2023     02/13/2023    LIP 33 02/13/2023    MG 2.0 08/05/2024         Imaging:  CT ABDOMEN+PELVIS(CONTRAST ONLY)(CPT=74177)    Result Date: 8/5/2024  CONCLUSION:   Severe acute appendicitis without abscess or perforation    Dictated by (CST): Chandler Hickey MD on 8/05/2024 at 9:36 PM      Finalized by (CST): Chandler Hickey MD on 8/05/2024 at 9:39 PM              Impression:     Acute appendicitis with localized peritonitis, without perforation, abscess, or gangrene      Severe sepsis (HCC)        Recommendations:  Pt seen. Labs, imaging, and exam consistent with acute appendicitis. Plan laparoscopic appendectomy. Keep NPO, continue IV abx and pain control.    Thank you for allowing me to participate in the care of your patient.    Arturo Rivera PA-C  8/6/2024    Addendum:    Pt seen and examined.  I agree with Arturo Rivera'sMILAN note.  Plan for lap appendectomy today.    Antonio Pineda MD

## 2024-08-06 NOTE — PLAN OF CARE
Patient ED admit. AO x4. Citizen of [place] speaking. Tenderness and pain to RLQ. Morphine PRN. IVF. NPO, ice chips OK. Up SBA with IV pole. V/s stable, on RA. Will have lap appy today with Dr. Pineda. Call light within reach. Fall precautions.     Problem: Patient Centered Care  Goal: Patient preferences are identified and integrated in the patient's plan of care  Description: Interventions:  - What would you like us to know as we care for you? Home with partner and son  - Provide timely, complete, and accurate information to patient/family  - Incorporate patient and family knowledge, values, beliefs, and cultural backgrounds into the planning and delivery of care  - Encourage patient/family to participate in care and decision-making at the level they choose  - Honor patient and family perspectives and choices  Outcome: Progressing     Problem: Patient/Family Goals  Goal: Patient/Family Long Term Goal  Description: Patient's Long Term Goal:     Interventions:  -   - See additional Care Plan goals for specific interventions  Outcome: Progressing  Goal: Patient/Family Short Term Goal  Description: Patient's Short Term Goal:     Interventions:   -   - See additional Care Plan goals for specific interventions  Outcome: Progressing     Problem: PAIN - ADULT  Goal: Verbalizes/displays adequate comfort level or patient's stated pain goal  Description: INTERVENTIONS:  - Encourage pt to monitor pain and request assistance  - Assess pain using appropriate pain scale  - Administer analgesics based on type and severity of pain and evaluate response  - Implement non-pharmacological measures as appropriate and evaluate response  - Consider cultural and social influences on pain and pain management  - Manage/alleviate anxiety  - Utilize distraction and/or relaxation techniques  - Monitor for opioid side effects  - Notify MD/LIP if interventions unsuccessful or patient reports new pain  - Anticipate increased pain with activity and pre-medicate  as appropriate  Outcome: Progressing     Problem: RISK FOR INFECTION - ADULT  Goal: Absence of fever/infection during anticipated neutropenic period  Description: INTERVENTIONS  - Monitor WBC  - Administer growth factors as ordered  - Implement neutropenic guidelines  Outcome: Progressing     Problem: DISCHARGE PLANNING  Goal: Discharge to home or other facility with appropriate resources  Description: INTERVENTIONS:  - Identify barriers to discharge w/pt and caregiver  - Include patient/family/discharge partner in discharge planning  - Arrange for needed discharge resources and transportation as appropriate  - Identify discharge learning needs (meds, wound care, etc)  - Arrange for interpreters to assist at discharge as needed  - Consider post-discharge preferences of patient/family/discharge partner  - Complete POLST form as appropriate  - Assess patient's ability to be responsible for managing their own health  - Refer to Case Management Department for coordinating discharge planning if the patient needs post-hospital services based on physician/LIP order or complex needs related to functional status, cognitive ability or social support system  Outcome: Progressing

## 2024-08-06 NOTE — ANESTHESIA PROCEDURE NOTES
Airway  Date/Time: 8/6/2024 2:29 PM  Urgency: Elective    Difficult airway    General Information and Staff    Patient location during procedure: OR  Anesthesiologist: Tylor Nguyen MD  Performed: anesthesiologist   Performed by: Tylor Nguyen MD  Authorized by: Tylor Nguyen MD      Indications and Patient Condition  Indications for airway management: anesthesia  Spontaneous ventilation: present  Sedation level: deep  Preoxygenated: yes  Patient position: sniffing  Mask difficulty assessment: 1 - vent by mask    Final Airway Details  Final airway type: endotracheal airway      Successful airway: ETT  Cuffed: yes   Successful intubation technique: direct laryngoscopy  Endotracheal tube insertion site: oral  Blade: GlideScope  Blade size: #4  ETT size (mm): 7.5    Cormack-Lehane Classification: grade I - full view of glottis  Placement verified by: capnometry   Measured from: teeth  ETT to teeth (cm): 24  Number of attempts at approach: 1    Additional Comments  Prior surgery loose upper teeth. Protected with teeth guard placed before intubation.

## 2024-08-06 NOTE — ANESTHESIA POSTPROCEDURE EVALUATION
Patient: Dada Pratt    Procedure Summary       Date: 08/06/24 Room / Location: [facility] MAIN OR  / [facility] MAIN OR    Anesthesia Start: 1421 Anesthesia Stop:     Procedure: LAPAROSCOPIC APPENDECTOMY (Abdomen) Diagnosis:       Appendicitis      (Appendicitis [K37])    Surgeons: Antonio Pineda MD Anesthesiologist: Edwar Nguyen MD    Anesthesia Type: general ASA Status: 2 - Emergent            Anesthesia Type: general    Vitals Value Taken Time   /81 08/06/24 1524   Temp 98.2 °F (36.8 °C) 08/06/24 1524   Pulse 91 08/06/24 1525   Resp 18 08/06/24 1525   SpO2 95 % 08/06/24 1525   Vitals shown include unfiled device data.    [facility] AN Post Evaluation:   Patient Evaluated in PACU  Patient Participation: complete - patient participated  Level of Consciousness: awake  Pain Management: adequate  Airway Patency:patent  Dental exam unchanged from preop  Yes    Cardiovascular Status: acceptable  Respiratory Status: acceptable  Postoperative Hydration acceptable      EDWAR NGUYEN MD  8/6/2024 3:25 PM

## 2024-08-06 NOTE — ED PROVIDER NOTES
Patient Seen in: [facility] Emergency Department      History   No chief complaint on file.    Stated Complaint: hip pain    Subjective:   HPI    54-year-old male presents for evaluation for right hip pain, right lower abdominal pain since last night.  Pain was initially more mild and intermittent but is now constant and severe.  He is feeling nauseated lightheaded and diaphoretic.  He denies any fevers.  He denies any diarrhea, constipation, dysuria, hematuria.    Objective:   History reviewed. No pertinent past medical history.           History reviewed. No pertinent surgical history.             Social History     Socioeconomic History    Marital status: Single   Tobacco Use    Smoking status: Former     Current packs/day: 0.00     Average packs/day: 0.5 packs/day for 14.0 years (7.0 ttl pk-yrs)     Types: Cigarettes     Start date: 12/15/1994     Quit date: 12/15/2008     Years since quitting: 15.6     Passive exposure: Never    Smokeless tobacco: Never   Vaping Use    Vaping status: Never Used   Substance and Sexual Activity    Alcohol use: Yes     Alcohol/week: 1.0 standard drink of alcohol     Types: 1 Cans of beer per week     Comment: social    Drug use: Never              Review of Systems    Positive for stated Chief Complaint: No chief complaint on file.    Other systems are as noted in HPI.  Constitutional and vital signs reviewed.      All other systems reviewed and negative except as noted above.    Physical Exam     ED Triage Vitals [08/05/24 1858]   BP (!) 73/43   Pulse 58   Resp 20   Temp 97.9 °F (36.6 °C)   Temp src Oral   SpO2 100 %   O2 Device None (Room air)       Current Vitals:   Vital Signs  BP: 139/85  Pulse: 110  Resp: 19  Temp: 98.2 °F (36.8 °C)  Temp src: Oral  MAP (mmHg): 100    Oxygen Therapy  SpO2: 97 %  O2 Device: None (Room air)            Physical Exam  Vitals and nursing note reviewed.   Constitutional:       Appearance: Normal appearance.   HENT:      Head:  Normocephalic and atraumatic.   Cardiovascular:      Rate and Rhythm: Normal rate and regular rhythm.      Pulses: Normal pulses.           Radial pulses are 2+ on the right side and 2+ on the left side.      Heart sounds: Normal heart sounds.   Pulmonary:      Effort: Pulmonary effort is normal.      Breath sounds: Normal breath sounds.   Abdominal:      General: Bowel sounds are normal.      Palpations: Abdomen is soft.      Tenderness: There is abdominal tenderness in the right lower quadrant. There is guarding.   Musculoskeletal:         General: Normal range of motion.      Cervical back: Normal range of motion.      Right lower leg: No edema.      Left lower leg: No edema.   Skin:     General: Skin is warm and dry.   Neurological:      General: No focal deficit present.      Mental Status: He is alert.               ED Course     Labs Reviewed   COMP METABOLIC PANEL (14) - Abnormal; Notable for the following components:       Result Value    Glucose 116 (*)     BUN 8 (*)     BUN/CREA Ratio 8.5 (*)     All other components within normal limits   URINALYSIS WITH CULTURE REFLEX - Abnormal; Notable for the following components:    Urine Color Colorless (*)     Ketones Urine 10 (*)     All other components within normal limits   CBC W/ DIFFERENTIAL - Abnormal; Notable for the following components:    WBC 13.4 (*)     Neutrophil Absolute Prelim 10.79 (*)     Neutrophil Absolute 10.79 (*)     All other components within normal limits   MAGNESIUM - Normal   LACTIC ACID, PLASMA - Normal   CBC WITH DIFFERENTIAL WITH PLATELET    Narrative:     The following orders were created for panel order CBC With Differential With Platelet.  Procedure                               Abnormality         Status                     ---------                               -----------         ------                     CBC W/ DIFFERENTIAL[[id number]]          Abnormal            Final result                 Please view results for these  tests on the individual orders.   BLOOD CULTURE   BLOOD CULTURE     EKG    Rate, intervals and axes as noted on EKG Report.  Rate: 78  Rhythm: Sinus Rhythm  Reading: no stemi, interpreted by myself.               ED Course as of 08/05/24 2322  ------------------------------------------------------------  Time: 08/05 2231  Value: CT ABDOMEN+PELVIS(CONTRAST ONLY)(CPT=74177)  Comment: Per my independent interpretation, patient's CT Abdomen and Pelvis demonstrates acute appendicitis.                MDM        Admission disposition: 8/5/2024 11:21 PM                                     [facility]  part of [facility]      Sepsis Reassessment Note    /85   Pulse 110   Temp 98.2 °F (36.8 °C) (Oral)   Resp 19   Wt 72.6 kg   SpO2 97%   BMI 23.63 kg/m²      I completed the sepsis reassessment at 2304h    Cardiac:  Regularity: Regular  Rate: Normal  Heart Sounds: S1,S2    Lungs:   Right: Clear  Left: Clear    Peripheral Pulses:  Radial: Right 1+ or Left 1+      Capillary Refill:  <3 Secs    Skin:  Temp/Moisture: Warm and Dry  Color: Normal      David Meléndez MD  8/5/2024  11:02 PM            Medical Decision Making  Differential diagnosis includes but is not limited to appendicitis, diverticulitis, cholecystitis, cholelithiasis, etc.    On arrival patient was diaphoretic and hypotensive.  This resolved with IV hydration.  This could have been vasovagal versus secondary to sepsis.  CBC does show leukocytosis.  Imaging demonstrates acute appendicitis.  Patient be started on Zosyn in light of the hypotension.  Surgery is on consult. Lactic acid was normal.     Rhythm Strip: Rate 110 sinus The cardiac monitor was ordered secondary to the patient's sepsis and hypotension.     Complicating factors: The patient  has no past medical history on file. and  has no past surgical history on file. that contribute to the medical complexity of this ED evaluation.     Medical Record Review: I  personally reviewed available prior medical records for any recent pertinent discharge summaries, testing, and procedures, and reviewed those reports.        Problems Addressed:  Acute appendicitis with localized peritonitis, without perforation, abscess, or gangrene: acute illness or injury with systemic symptoms  Severe sepsis (HCC): acute illness or injury with systemic symptoms    Amount and/or Complexity of Data Reviewed  Labs: ordered. Decision-making details documented in ED Course.  Radiology: ordered and independent interpretation performed. Decision-making details documented in ED Course.  ECG/medicine tests: ordered and independent interpretation performed. Decision-making details documented in ED Course.  Discussion of management or test interpretation with external provider(s): Discussed with Dr. Subramanian who accepts admission. Dr. Pineda accepts surgical consultation.     Risk  Parenteral controlled substances.  Decision regarding hospitalization.  Risk Details: IV morphine    Critical Care  Total time providing critical care: minutes (55 minutes including time spent examining and re-evaluating the patient, ordering and reviewing laboratory tests, documenting, reviewing previous records, obtaining information from the family, and speaking with consultants, admitting doctors, nurses and medics and excludes any time spent on procedures.  )        Disposition and Plan     Clinical Impression:  1. Acute appendicitis with localized peritonitis, without perforation, abscess, or gangrene    2. Severe sepsis (HCC)         Disposition:  Admit  8/5/2024 11:21 pm    Follow-up:  No follow-up provider specified.        Medications Prescribed:  Current Discharge Medication List                            Hospital Problems       Present on Admission  Date Reviewed: 2/22/2024            ICD-10-CM Noted POA    * (Principal) Acute appendicitis with localized peritonitis, without perforation, abscess, or gangrene K35.30  8/5/2024 Unknown

## 2024-08-06 NOTE — PROGRESS NOTES
[facility]  part of [facility]    Progress Note    Dada Pratt Patient Status:  Inpatient    1970 MRN [id number]   Location [facility] 4W/SW/SE Attending Minh Malcolm MD   Hosp Day # 0 PCP Becky Anand MD       Subjective:   Dada Pratt is a(n) 54 year old male admitted with abdominal pain .    Plan for lap appy  Pt seen and examined, pain still present but better. No new complaints.     Objective:   Blood pressure 121/76, pulse 84, temperature 98.4 °F (36.9 °C), temperature source Temporal, resp. rate 16, weight 157 lb 10.1 oz (71.5 kg), SpO2 96%.    GENERAL:  The patient appeared to be in no distress and was comfortable.  SKIN:  Warm and hydrated  PSYCHIATRIC: Calm and cooperative   HEENT:  Head was atraumatic and normocephalic.  Eyes: Sclera was anicteric.  Pupils were equal.  Ears:  There were no lesions.  Nose:  No lesions were noted.     NECK:  Supple.  There was no JVD.   CVS:  S1S2 RRR no murmurs  LUNGS:  No audible wheezing  ABDOMEN: Non-distended, +TTP RLQ, +rebound, no guarding   MUSCULOSKELETAL:  There was no deformity.  There was full range of motion in all the extremities.   EXTREMITIES: There was no edema, clubbing or cyanosis  NEUROLOGICAL:  There was no focal deficit.       piperacillin-tazobactam  3.375 g Intravenous Q8H    [Transfer Hold] pantoprazole  40 mg Intravenous Daily    [START ON 2024] heparin  5,000 Units Subcutaneous 2 times per day       Results:     Lab Results   Component Value Date    WBC 13.1 (H) 2024    HGB 14.0 2024    HCT 40.9 2024    .0 2024    CREATSERUM 0.88 2024    BUN 7 (L) 2024     2024    K 4.2 2024     2024    CO2 25.0 2024     (H) 2024    CA 8.7 2024    ALB 4.5 2024    ALKPHO 83 2024    BILT 1.1 2024    TP 7.5 2024    AST 22 2024    ALT 17 2024    TSH 1.480 2023      02/13/2023    LIP 33 02/13/2023    MG 2.0 08/05/2024       CT ABDOMEN+PELVIS(CONTRAST ONLY)(CPT=74177)    Result Date: 8/5/2024  CONCLUSION:   Severe acute appendicitis without abscess or perforation    Dictated by (CST): Chandler Hickey MD on 8/05/2024 at 9:36 PM     Finalized by (CST): Chandler Hickey MD on 8/05/2024 at 9:39 PM         EKG 12 Lead    Result Date: 8/6/2024  Normal sinus rhythm Normal ECG No previous ECGs found in [facility] Confirmed by SHAMEKA HUGHES ([id number]) on 8/6/2024 2:34:49 PM     Assessment and Plan:        Acute appendicitis.  Without perforation/abscess. Pain is present.  CT abdomen/pelvis consistent with appendicitis.  -General surgery consulted,  appendectomy today  -IV antibiotics: Zosyn  -Pain control with morphine as needed  -Follow white count  -Diet: N.p.o.     Sepsis with Hypotension  -Patient noted to be hypotensive on presentation with SBP 73, no history of hypertension/hypotension.  Not on any blood pressure medications.  + Leukocytosis present. Fever is not present.   -Treated per sepsis protocol in the ED with improvement of SBP.  Will continue IV fluid hydration at 100 cc/h.  -Received broad-spectrum antibiotics with Zosyn in the ED.  Will continue with Zosyn for now.  -Lactic acid/blood cultures currently pending     Prophylaxis  SCDs     CODE STATUS  Full             Code Status: Not on file  DVT Prophylaxis:scd  GI Prophylaxis:ppi    MDM high complexity: pt npo, iv fluids , iv abx and needs surgery    Minh Malcolm M.D.

## 2024-08-06 NOTE — ANESTHESIA PREPROCEDURE EVALUATION
Anesthesia PreOp Note    HPI:     Dada Pratt is a 54 year old male who presents for preoperative consultation requested by: Antonio Pineda MD    Date of Surgery: 8/5/2024 - 8/6/2024    Procedure(s):  LAPAROSCOPIC APPENDECTOMY, POSSIBLE OPEN  Indication: Appendicitis [K37]    Relevant Problems   No relevant active problems       NPO:  Last Liquid Consumption Date: 08/06/24  Last Liquid Consumption Time: 0000  Last Solid Consumption Date: 08/06/24  Last Solid Consumption Time: 0000  Last Liquid Consumption Date: 08/06/24          History Review:  Patient Active Problem List    Diagnosis Date Noted    Severe sepsis (HCC) 08/06/2024    Acute appendicitis with localized peritonitis, without perforation, abscess, or gangrene 08/05/2024       History reviewed. No pertinent past medical history.    History reviewed. No pertinent surgical history.    Medications Prior to Admission   Medication Sig Dispense Refill Last Dose    traMADol 50 MG Oral Tab Take 1 tablet (50 mg total) by mouth every 6 (six) hours as needed for Pain. 30 tablet 1 8/5/2024    methocarbamol 750 MG Oral Tab Take 1 tablet (750 mg total) by mouth 4 (four) times daily as needed (pain). (Patient not taking: Reported on 2/22/2024) 60 tablet 0     Ketorolac Tromethamine 10 MG Oral Tab TAKE 1 TABLET BY MOUTH EVERY 6 HOURS FOR UP TO 5 DAYS AS NEEDED FOR PAIN (Patient not taking: Reported on 5/22/2023)        Current Facility-Administered Medications Ordered in Epic   Medication Dose Route Frequency Provider Last Rate Last Admin    sodium chloride 0.9% infusion   Intravenous Continuous Kathleen Subramanian  mL/hr at 08/06/24 1031 New Bag at 08/06/24 1031    acetaminophen (Tylenol Extra Strength) tab 500 mg  500 mg Oral Q4H PRN Kathleen Subramanian MD        polyethylene glycol (PEG 3350) (Miralax) 17 g oral packet 17 g  17 g Oral Daily PRN Kathleen Subramanian MD        sennosides (Senokot) tab 17.2 mg  17.2 mg Oral Nightly PRN Kathleen Subramanian MD         bisacodyl (Dulcolax) 10 MG rectal suppository 10 mg  10 mg Rectal Daily PRN Kathleen Subramanian MD        fleet enema (Fleet) 7-19 GM/118ML rectal enema 133 mL  1 enema Rectal Once PRN Kathleen Subramanian MD        ondansetron (Zofran) 4 MG/2ML injection 4 mg  4 mg Intravenous Q6H PRN Kathleen Subramanian MD        prochlorperazine (Compazine) 10 MG/2ML injection 5 mg  5 mg Intravenous Q8H PRN Kathleen Subramanian MD        morphINE PF 2 MG/ML injection 1 mg  1 mg Intravenous Q2H PRN Kathleen Subramanian MD        Or    morphINE PF 2 MG/ML injection 2 mg  2 mg Intravenous Q2H PRN Kathleen Subramanian MD   2 mg at 08/06/24 1028    Or    morphINE PF 4 MG/ML injection 4 mg  4 mg Intravenous Q2H PRN Kathleen Subramanian MD        piperacillin-tazobactam (Zosyn) 3.375 g in dextrose 5% 100 mL IVPB-ADDV  3.375 g Intravenous Q8H Kathleen Subramanian MD 25 mL/hr at 08/06/24 0602 3.375 g at 08/06/24 0602    pantoprazole (Protonix) 40 mg in sodium chloride 0.9% PF 10 mL IV push  40 mg Intravenous Daily Kathleen Subramanian MD   40 mg at 08/06/24 1021     No current [facility]-ordered outpatient medications on file.       No Known Allergies    Family History   Problem Relation Age of Onset    Hypertension Father     Diabetes Father      Social History     Socioeconomic History    Marital status: Single   Tobacco Use    Smoking status: Former     Current packs/day: 0.00     Average packs/day: 0.5 packs/day for 14.0 years (7.0 ttl pk-yrs)     Types: Cigarettes     Start date: 12/15/1994     Quit date: 12/15/2008     Years since quitting: 15.6     Passive exposure: Never    Smokeless tobacco: Never   Vaping Use    Vaping status: Never Used   Substance and Sexual Activity    Alcohol use: Yes     Alcohol/week: 1.0 standard drink of alcohol     Types: 1 Cans of beer per week     Comment: social    Drug use: Never       Available pre-op labs reviewed.  Lab Results   Component Value Date    WBC 13.1 (H) 08/06/2024    RBC 4.50 08/06/2024     HGB 14.0 08/06/2024    HCT 40.9 08/06/2024    MCV 90.9 08/06/2024    MCH 31.1 08/06/2024    MCHC 34.2 08/06/2024    RDW 12.7 08/06/2024    .0 08/06/2024     Lab Results   Component Value Date     08/06/2024    K 4.2 08/06/2024     08/06/2024    CO2 25.0 08/06/2024    BUN 7 (L) 08/06/2024    CREATSERUM 0.88 08/06/2024     (H) 08/06/2024    CA 8.7 08/06/2024          Vital Signs:  Body mass index is 23.28 kg/m².   weight is 71.5 kg (157 lb 10.1 oz). His temporal temperature is 98.3 °F (36.8 °C). His blood pressure is 124/83 and his pulse is 86. His respiration is 18 and oxygen saturation is 99%.   Vitals:    08/06/24 0111 08/06/24 0139 08/06/24 0420 08/06/24 0802   BP:   122/73 124/83   Pulse:  92 93 86   Resp:   18 18   Temp: 98.8 °F (37.1 °C)  98.8 °F (37.1 °C) 98.3 °F (36.8 °C)   TempSrc: Oral  Oral Temporal   SpO2:   98% 99%   Weight:            Anesthesia Evaluation      Airway   Mallampati: I  TM distance: >3 FB  Neck ROM: full  Dental      Pulmonary - normal exam   Cardiovascular - normal exam    Neuro/Psych      GI/Hepatic/Renal      Endo/Other    Abdominal  - normal exam                 Anesthesia Plan:   ASA:  2  Emergent    Plan:   General  Airway:  ETT  Informed Consent Plan and Risks Discussed With:  Patient      I have informed Dada Pratt and/or legal guardian or family member of the nature of the anesthetic plan, benefits, risks including possible dental damage if relevant, major complications, and any alternative forms of anesthetic management.   All of the patient's questions were answered to the best of my ability. The patient desires the anesthetic management as planned.  EDWAR SCHROEDER MD  8/6/2024 1:35 PM  Present on Admission:  **None**

## 2024-08-06 NOTE — OPERATIVE REPORT
[facility]  part of [facility]     Operative Report    Patient Name:  Dada Pratt  MR:  [id number]  :  1970  DOS:  24    Preop Dx:  Appendicitis [K37]  Postop Dx:  Perforated appendicitis  Procedure:  Laparoscopic appendectomy  Surgeon:  Antonio Pineda MD  Surgical Assistant.: Asaf Lee CSA  EBL: 5 ml  Complication:  None    INDICATION:  Pt is a 54 year old male who with Appendicitis [K37] who is scheduled for a LAPAROSCOPIC APPENDECTOMY.    CONSENT:  An informed consent discussion was held with the patient regarding the nature of acute appendicitis, the treatment options and the details of the procedure.  The risks including but not limited to bleeding, wound infection, intra-abdominal infection, injury to the colon, small intestine, right ureter, incomplete resection, and incisional hernia were discussed.  The patient expressed understanding and want to proceed with the planned procedure.    TECHNIQUE:  The patient was taken to the OR and placed in supine position.  General anesthesia was established and the abdomen was prepped in standard fashion.  Pneumoperitoneum was obtained using open technique through a supra-umbilical incision.  A 12-mm trocar was inserted under direct visualization and no injury occurred.  Examination of the abdomen showed adhesions of the omentum and small bowel to the RLQ.  Two 5-mm trocars were placed in the LLQ and supra-pubic area.  The patient was placed in Trendelenburg position.  Blunt dissection was used to separate the omentum and small bowel from the appendix.  A necrotic and perforated area was seen on the appendix near the base.  Fortunately for the patient, the perforation was sealed by the omentum and minimal murky fluid was seen in the peritoneal cavity.  The mesoappendix was divided using the [place] ligasure.  The base of the appendix was divided using a 45 mm grey Tristaple load linear stapler.  The appendix was delivered from  the abdomen using an endocatch bag.  The abdomen was irrigated with saline and found to be hemostatic.  The staple line was hemostatic.  The ports were withdrawn under direct visualization and no port site bleeding was seen.  The supra-umbilical fascia was closed using 0 vicryl.  The skin incisions were closed using 4-0 vicryl.  Sterile dressings were applied.  All instrument and sponge counts were correct.  I was present during the critical portions of the procedure.    Antonio Pineda MD

## 2024-08-07 VITALS
TEMPERATURE: 98 F | DIASTOLIC BLOOD PRESSURE: 83 MMHG | RESPIRATION RATE: 16 BRPM | WEIGHT: 157.63 LBS | HEART RATE: 75 BPM | SYSTOLIC BLOOD PRESSURE: 123 MMHG | OXYGEN SATURATION: 98 % | BODY MASS INDEX: 23 KG/M2

## 2024-08-07 DIAGNOSIS — R10.11 RUQ PAIN: ICD-10-CM

## 2024-08-07 PROBLEM — K35.890 OTHER ACUTE APPENDICITIS WITHOUT PERFORATION OR GANGRENE: Status: ACTIVE | Noted: 2024-08-05

## 2024-08-07 LAB
ANION GAP SERPL CALC-SCNC: 7 MMOL/L (ref 0–18)
BASOPHILS # BLD AUTO: 0.01 X10(3) UL (ref 0–0.2)
BASOPHILS NFR BLD AUTO: 0.1 %
BUN BLD-MCNC: 7 MG/DL (ref 9–23)
BUN/CREAT SERPL: 9.1 (ref 10–20)
CALCIUM BLD-MCNC: 8.9 MG/DL (ref 8.7–10.4)
CHLORIDE SERPL-SCNC: 110 MMOL/L (ref 98–112)
CO2 SERPL-SCNC: 22 MMOL/L (ref 21–32)
CREAT BLD-MCNC: 0.77 MG/DL
DEPRECATED RDW RBC AUTO: 42.2 FL (ref 35.1–46.3)
EGFRCR SERPLBLD CKD-EPI 2021: 108 ML/MIN/1.73M2 (ref 60–?)
EOSINOPHIL # BLD AUTO: 0 X10(3) UL (ref 0–0.7)
EOSINOPHIL NFR BLD AUTO: 0 %
ERYTHROCYTE [DISTWIDTH] IN BLOOD BY AUTOMATED COUNT: 12.6 % (ref 11–15)
GLUCOSE BLD-MCNC: 121 MG/DL (ref 70–99)
HCT VFR BLD AUTO: 39.2 %
HGB BLD-MCNC: 13.3 G/DL
IMM GRANULOCYTES # BLD AUTO: 0.05 X10(3) UL (ref 0–1)
IMM GRANULOCYTES NFR BLD: 0.4 %
LYMPHOCYTES # BLD AUTO: 0.44 X10(3) UL (ref 1–4)
LYMPHOCYTES NFR BLD AUTO: 3.4 %
MCH RBC QN AUTO: 31.1 PG (ref 26–34)
MCHC RBC AUTO-ENTMCNC: 33.9 G/DL (ref 31–37)
MCV RBC AUTO: 91.6 FL
MONOCYTES # BLD AUTO: 0.56 X10(3) UL (ref 0.1–1)
MONOCYTES NFR BLD AUTO: 4.4 %
NEUTROPHILS # BLD AUTO: 11.76 X10 (3) UL (ref 1.5–7.7)
NEUTROPHILS # BLD AUTO: 11.76 X10(3) UL (ref 1.5–7.7)
NEUTROPHILS NFR BLD AUTO: 91.7 %
OSMOLALITY SERPL CALC.SUM OF ELEC: 287 MOSM/KG (ref 275–295)
PLATELET # BLD AUTO: 198 10(3)UL (ref 150–450)
POTASSIUM SERPL-SCNC: 3.9 MMOL/L (ref 3.5–5.1)
RBC # BLD AUTO: 4.28 X10(6)UL
SODIUM SERPL-SCNC: 139 MMOL/L (ref 136–145)
WBC # BLD AUTO: 12.8 X10(3) UL (ref 4–11)

## 2024-08-07 PROCEDURE — 99239 HOSP IP/OBS DSCHRG MGMT >30: CPT | Performed by: INTERNAL MEDICINE

## 2024-08-07 NOTE — PROGRESS NOTES
[facility]  Progress Note    Dada Pratt Patient Status:  Inpatient    1970 MRN [id number]   Location [facility] 4W/SW/SE Attending Minh Malcolm MD   Hosp Day # 1 PCP Becky Anand MD     Subjective:  Patient is pleasant and resting in bed.  He reports mild incisional tenderness.  He denies nausea, vomiting, or diarrhea.  He is passing gas but has not had a bowel movement today.  He has been able to ambulate and is tolerating soft diet well.    Objective/Physical Exam:  General: Alert, orientated x3.  Cooperative.  No apparent distress.  Vital Signs:  Blood pressure 106/83, pulse 88, temperature 98.2 °F (36.8 °C), temperature source Oral, resp. rate 16, weight 157 lb 10.1 oz (71.5 kg), SpO2 99%.  Wt Readings from Last 3 Encounters:   24 157 lb 10.1 oz (71.5 kg)   24 172 lb (78 kg)   23 163 lb 12.8 oz (74.3 kg)     Lungs: No respiratory distress.  Cardiac: Regular rate and rhythm.   Abdomen:  Soft, not distended, mildly tender, with no rebound or guarding.  No peritoneal signs.   Extremities:  No lower extremity edema noted.    Incision: Clean, dry, intact, no erythema      Intake/Output:    Intake/Output Summary (Last 24 hours) at 2024 1040  Last data filed at 2024 1524  Gross per 24 hour   Intake 600 ml   Output --   Net 600 ml     I/O last 3 completed shifts:  In: 3878 [I.V.:3778; IV PIGGYBACK:100]  Out: -   No intake/output data recorded.    Medications:    piperacillin-tazobactam  3.375 g Intravenous Q8H    [Transfer Hold] pantoprazole  40 mg Intravenous Daily    heparin  5,000 Units Subcutaneous 2 times per day       Labs:  Lab Results   Component Value Date    WBC 12.8 2024    HGB 13.3 2024    HCT 39.2 2024    .0 2024     Lab Results   Component Value Date     2024    K 3.9 2024     2024    CO2 22.0 2024    BUN 7 2024    CREATSERUM 0.77 2024     2024     CA 8.9 08/07/2024     No results found for: \"PT\", \"INR\"      Assessment  Patient Active Problem List   Diagnosis    Acute appendicitis with perforation, localized peritonitis, and gangrene, without abscess    Severe sepsis (HCC)    Acute appendicitis with localized peritonitis, without perforation, abscess, or gangrene     POD 1: Laparoscopic appendectomy      Plan:  Continue diet as tolerated  Discharge instructions reviewed with patient  Patient stable from surgical standpoint to discharge to home  Follow-up in 2 weeks in clinic, sooner if needed    Quality:  DVT Mechanical Prophylaxis:   SCDs, Early ambuation  DVT Pharmacologic Prophylaxis   Medication    heparin (Porcine) 5000 UNIT/ML injection 5,000 Units                Code Status: Full Code  Jacome: No urinary catheter in place  Jacome Duration (in days):   Central line:    ALFREDO: 8/7/2024        TU Figueroa  8/7/2024  10:40 AM

## 2024-08-07 NOTE — PLAN OF CARE
POD1. Dressing clean dry and intact. Abdominal binder on for comfort. AOX4. Room air. General diet, tolerated well. SCDS For dct prophylaxis. Voiding freely. Passing flatus. Saline locked. Pt reported discomfort from surgical site; decline pain management intervention. Cleared for discharge by MDS. Discharge instructions given at bedside. Discharging home.     Problem: Patient Centered Care  Goal: Patient preferences are identified and integrated in the patient's plan of care  Description: Interventions:  - What would you like us to know as we care for you?   - Provide timely, complete, and accurate information to patient/family  - Incorporate patient and family knowledge, values, beliefs, and cultural backgrounds into the planning and delivery of care  - Encourage patient/family to participate in care and decision-making at the level they choose  - Honor patient and family perspectives and choices  Outcome: Adequate for Discharge     Problem: Patient/Family Goals  Goal: Patient/Family Long Term Goal  Description: Patient's Long Term Goal:     Interventions:  -   - See additional Care Plan goals for specific interventions  Outcome: Adequate for Discharge  Goal: Patient/Family Short Term Goal  Description: Patient's Short Term Goal:     Interventions:   - See additional Care Plan goals for specific interventions  Outcome: Adequate for Discharge     Problem: PAIN - ADULT  Goal: Verbalizes/displays adequate comfort level or patient's stated pain goal  Description: INTERVENTIONS:  - Encourage pt to monitor pain and request assistance  - Assess pain using appropriate pain scale  - Administer analgesics based on type and severity of pain and evaluate response  - Implement non-pharmacological measures as appropriate and evaluate response  - Consider cultural and social influences on pain and pain management  - Manage/alleviate anxiety  - Utilize distraction and/or relaxation techniques  - Monitor for opioid side effects  -  Notify MD/LIP if interventions unsuccessful or patient reports new pain  - Anticipate increased pain with activity and pre-medicate as appropriate  Outcome: Adequate for Discharge     Problem: RISK FOR INFECTION - ADULT  Goal: Absence of fever/infection during anticipated neutropenic period  Description: INTERVENTIONS  - Monitor WBC  - Administer growth factors as ordered  - Implement neutropenic guidelines  Outcome: Adequate for Discharge     Problem: DISCHARGE PLANNING  Goal: Discharge to home or other facility with appropriate resources  Description: INTERVENTIONS:  - Identify barriers to discharge w/pt and caregiver  - Include patient/family/discharge partner in discharge planning  - Arrange for needed discharge resources and transportation as appropriate  - Identify discharge learning needs (meds, wound care, etc)  - Arrange for interpreters to assist at discharge as needed  - Consider post-discharge preferences of patient/family/discharge partner  - Complete POLST form as appropriate  - Assess patient's ability to be responsible for managing their own health  - Refer to Case Management Department for coordinating discharge planning if the patient needs post-hospital services based on physician/LIP order or complex needs related to functional status, cognitive ability or social support system  Outcome: Adequate for Discharge

## 2024-08-07 NOTE — PLAN OF CARE
POD0. Dressing clean dry and intact. Abdominal binder on. AOX4. Room air. Tolerated diet well advanced to soft per orders. TEDS and scds for dvt prophylaxis. IV fluids running. Call light within reach.    Problem: Patient Centered Care  Goal: Patient preferences are identified and integrated in the patient's plan of care  Description: Interventions:  - What would you like us to know as we care for you?   - Provide timely, complete, and accurate information to patient/family  - Incorporate patient and family knowledge, values, beliefs, and cultural backgrounds into the planning and delivery of care  - Encourage patient/family to participate in care and decision-making at the level they choose  - Honor patient and family perspectives and choices  Outcome: Progressing     Problem: Patient/Family Goals  Goal: Patient/Family Long Term Goal  Description: Patient's Long Term Goal:     Interventions:  -   - See additional Care Plan goals for specific interventions  Outcome: Progressing  Goal: Patient/Family Short Term Goal  Description: Patient's Short Term Goal:     Interventions:   -   - See additional Care Plan goals for specific interventions  Outcome: Progressing     Problem: PAIN - ADULT  Goal: Verbalizes/displays adequate comfort level or patient's stated pain goal  Description: INTERVENTIONS:  - Encourage pt to monitor pain and request assistance  - Assess pain using appropriate pain scale  - Administer analgesics based on type and severity of pain and evaluate response  - Implement non-pharmacological measures as appropriate and evaluate response  - Consider cultural and social influences on pain and pain management  - Manage/alleviate anxiety  - Utilize distraction and/or relaxation techniques  - Monitor for opioid side effects  - Notify MD/LIP if interventions unsuccessful or patient reports new pain  - Anticipate increased pain with activity and pre-medicate as appropriate  Outcome: Progressing     Problem: RISK  FOR INFECTION - ADULT  Goal: Absence of fever/infection during anticipated neutropenic period  Description: INTERVENTIONS  - Monitor WBC  - Administer growth factors as ordered  - Implement neutropenic guidelines  Outcome: Progressing     Problem: DISCHARGE PLANNING  Goal: Discharge to home or other facility with appropriate resources  Description: INTERVENTIONS:  - Identify barriers to discharge w/pt and caregiver  - Include patient/family/discharge partner in discharge planning  - Arrange for needed discharge resources and transportation as appropriate  - Identify discharge learning needs (meds, wound care, etc)  - Arrange for interpreters to assist at discharge as needed  - Consider post-discharge preferences of patient/family/discharge partner  - Complete POLST form as appropriate  - Assess patient's ability to be responsible for managing their own health  - Refer to Case Management Department for coordinating discharge planning if the patient needs post-hospital services based on physician/LIP order or complex needs related to functional status, cognitive ability or social support system  Outcome: Progressing

## 2024-08-07 NOTE — DISCHARGE SUMMARY
[facility]  part of [facility]    Discharge Summary    Dada Pratt Patient Status:  Inpatient    1970 MRN [id number]   Location [facility] 4W/SW/SE Attending Minh Malcolm MD   Hosp Day # 1 PCP Becky Anand MD     Date of Admission: 2024      Date of Discharge: 24    Admitting Diagnosis: Severe sepsis (HCC) [A41.9, R65.20]  Acute appendicitis with localized peritonitis, without perforation, abscess, or gangrene [K35.30]    Hospital Discharge Diagnoses:   Severe sepsis (HCC) [A41.9, R65.20]  Acute appendicitis with localized peritonitis, without perforation, abscess, or gangrene [K35.30]    Lace+ Score: 26  59-90 High Risk  29-58 Medium Risk  0-28   Low Risk.    TCM Follow-Up Recommendation:  LACE < 29: Low Risk of readmission after discharge from the hospital; Still recommend for TCM follow-up.          Problem List:   Patient Active Problem List   Diagnosis    Acute appendicitis with perforation, localized peritonitis, and gangrene, without abscess    Severe sepsis (HCC)    Acute appendicitis with localized peritonitis, without perforation, abscess, or gangrene         Physical Exam:   Vitals:    24 1300   BP: 123/83   Pulse: 75   Resp: 16   Temp:      GENERAL:  The patient appeared to be in no distress and was comfortable.  SKIN:  Warm and hydrated  PSYCHIATRIC: Calm and cooperative   HEENT:  Head was atraumatic and normocephalic.  Eyes: Sclera was anicteric.  Pupils were equal.  Ears:  There were no lesions.  Nose:  No lesions were noted.     NECK:  Supple.  There was no JVD.   CVS:  S1S2 RRR no murmurs  LUNGS:  No audible wheezing  ABDOMEN: Non-distended, +general mild ttp  MUSCULOSKELETAL:  There was no deformity.  There was full range of motion in all the extremities.   EXTREMITIES: There was no edema, clubbing or cyanosis  NEUROLOGICAL:  There was no focal deficit.        History of Present Illness: see HPI      Hospital Course: pt admitted, stated  on iv abx and fluids, hypotension improved. Imaging with severe acute appendicitis. Seen by surgery and had lap appy. Today he is doing well. Vitals stable. + gas. Cleared for discharge by surgery. Doing well per nursing. Outpt f/u with surgery.     Discharge Condition: Good    Discharge Medications:      Discharge Medications        CONTINUE taking these medications        Instructions Prescription details   traMADol 50 MG Tabs  Commonly known as: Ultram      Take 1 tablet (50 mg total) by mouth every 6 (six) hours as needed for Pain.   Quantity: 30 tablet  Refills: 1            STOP taking these medications      Ketorolac Tromethamine 10 MG Tabs  Commonly known as: TORADOL        methocarbamol 750 MG Tabs  Commonly known as: Robaxin                     Minh Malcolm MD  8/7/2024  1:42 PM    Greater than 30 minutes spent on preparation and coordination of this discharge

## 2024-08-08 NOTE — PAYOR COMM NOTE
--------------  DISCHARGE REVIEW    Payor: [employer]/MCNP  Subscriber #:  [id number]  Authorization Number: [id number]    Admit date: 24  Admit time:  12:57 AM  Discharge Date: 2024  2:24 PM     Admitting Physician: Kathleen Subramanian MD  Attending Physician:  No att. providers found  Primary Care Physician: Becky Anand MD          Discharge Summary Notes        Discharge Summary signed by Minh Malcolm MD at 2024  2:04 PM       Author: Minh Malcolm MD Specialty: HOSPITALIST Author Type: Physician    Filed: 2024  2:04 PM Date of Service: 2024  1:42 PM Status: Signed    : Minh Malcolm MD (Physician)           [facility]  part of [facility]    Discharge Summary    Dada Pratt Patient Status:  Inpatient    1970 MRN [id number]   Location [facility] 4W/SW/SE Attending Minh Malcolm MD   Hosp Day # 1 PCP Becky Anand MD     Date of Admission: 2024      Date of Discharge: 24    Admitting Diagnosis: Severe sepsis (HCC) [A41.9, R65.20]  Acute appendicitis with localized peritonitis, without perforation, abscess, or gangrene [K35.30]    Hospital Discharge Diagnoses:   Severe sepsis (HCC) [A41.9, R65.20]  Acute appendicitis with localized peritonitis, without perforation, abscess, or gangrene [K35.30]    Lace+ Score: 26  59-90 High Risk  29-58 Medium Risk  0-28   Low Risk.    TCM Follow-Up Recommendation:  LACE < 29: Low Risk of readmission after discharge from the hospital; Still recommend for TCM follow-up.          Problem List:   Patient Active Problem List   Diagnosis    Acute appendicitis with perforation, localized peritonitis, and gangrene, without abscess    Severe sepsis (HCC)    Acute appendicitis with localized peritonitis, without perforation, abscess, or gangrene         Physical Exam:   Vitals:    24 1300   BP: 123/83   Pulse: 75   Resp: 16   Temp:      GENERAL:  The patient appeared to be in no distress  and was comfortable.  SKIN:  Warm and hydrated  PSYCHIATRIC: Calm and cooperative   HEENT:  Head was atraumatic and normocephalic.  Eyes: Sclera was anicteric.  Pupils were equal.  Ears:  There were no lesions.  Nose:  No lesions were noted.     NECK:  Supple.  There was no JVD.   CVS:  S1S2 RRR no murmurs  LUNGS:  No audible wheezing  ABDOMEN: Non-distended, +general mild ttp  MUSCULOSKELETAL:  There was no deformity.  There was full range of motion in all the extremities.   EXTREMITIES: There was no edema, clubbing or cyanosis  NEUROLOGICAL:  There was no focal deficit.        History of Present Illness: see HPI      Hospital Course: pt admitted, stated on iv abx and fluids, hypotension improved. Imaging with severe acute appendicitis. Seen by surgery and had lap appy. Today he is doing well. Vitals stable. + gas. Cleared for discharge by surgery. Doing well per nursing. Outpt f/u with surgery.     Discharge Condition: Good    Discharge Medications:      Discharge Medications        CONTINUE taking these medications        Instructions Prescription details   traMADol 50 MG Tabs  Commonly known as: Ultram      Take 1 tablet (50 mg total) by mouth every 6 (six) hours as needed for Pain.   Quantity: 30 tablet  Refills: 1            STOP taking these medications      Ketorolac Tromethamine 10 MG Tabs  Commonly known as: TORADOL        methocarbamol 750 MG Tabs  Commonly known as: Robaxin                     Minh Malcolm MD  8/7/2024  1:42 PM    Greater than 30 minutes spent on preparation and coordination of this discharge    Electronically signed by Minh Malcolm MD on 8/7/2024  2:04 PM         REVIEWER COMMENTS

## 2024-08-12 ENCOUNTER — TELEPHONE (OUTPATIENT)
Dept: SURGERY | Facility: CLINIC | Age: 54
End: 2024-08-12

## 2024-08-12 RX ORDER — TRAMADOL HYDROCHLORIDE 50 MG/1
50 TABLET ORAL EVERY 6 HOURS PRN
Qty: 30 TABLET | Refills: 1 | Status: SHIPPED | OUTPATIENT
Start: 2024-08-12

## 2024-08-12 NOTE — TELEPHONE ENCOUNTER
Per patient needs note for work stating when he can return to work and if there is any restrictions. Note can be put on mychart.

## 2024-08-12 NOTE — TELEPHONE ENCOUNTER
Please review. Protocol Failed; No Protocol      Recent fills: 7/12/2023  Last Rx written: 7/12/2023  Last office visit: 7/12/2023    Recent Visits  Date Type Provider Dept   02/22/24 Office Visit Adelso Gonzalez DO [facility]           Requested Prescriptions   Pending Prescriptions Disp Refills    traMADol 50 MG Oral Tab 30 tablet 1     Sig: Take 1 tablet (50 mg total) by mouth every 6 (six) hours as needed for Pain.       Controlled Substance Medication Failed - 8/7/2024  6:04 PM        Failed - This medication is a controlled substance - forward to provider to refill                 Recent Outpatient Visits              5 months ago Scleral hemorrhage of right eye    [facility] [place] Adelso Gonzalez DO    Office Visit    1 year ago [facility] low [facility], [facility]    Office Visit    1 year ago Well adult exam    [facility]    Office Visit    1 year ago     [facility] Brunner, Heather, PT    Office Visit    1 year ago     [facility] Massiel eNwell, PT    Office Visit

## 2024-08-20 ENCOUNTER — TELEPHONE (OUTPATIENT)
Dept: SURGERY | Facility: CLINIC | Age: 54
End: 2024-08-20

## 2024-08-20 ENCOUNTER — OFFICE VISIT (OUTPATIENT)
Dept: SURGERY | Facility: CLINIC | Age: 54
End: 2024-08-20

## 2024-08-20 VITALS — WEIGHT: 157 LBS | BODY MASS INDEX: 23 KG/M2

## 2024-08-20 DIAGNOSIS — Z09 POSTOPERATIVE EXAMINATION: Primary | ICD-10-CM

## 2024-08-20 PROCEDURE — 99024 POSTOP FOLLOW-UP VISIT: CPT | Performed by: SURGERY

## 2024-08-20 NOTE — PROGRESS NOTES
Chief Complaint   Patient presents with    Post-Op     S/P lap appi 8/6/2024.  Patient states he has no pain, bowels are normal and regular, appetite is good.  Denies fevers.         O:  Wt 157 lb (71.2 kg)   BMI 23.18 kg/m²   GEN:  No acute distress  Abd:  Soft, NTND, incisions C/D/I    Path:  Reviewed w pt    Assessment   1. Postoperative examination        Doing well sp Lap appy.  Continue to avoid heavy lifting for another month.  F/u prn.         Antonio Pineda MD

## 2024-08-20 NOTE — TELEPHONE ENCOUNTER
Please try to avoid signing forms in the corner as it is not visible when printing and forms are not accepted this way. Thank you!     / Arturo      *The ACKNOWLEDGE button has been moved to the top right ribbon*    Please sign off on form if you agree to: FMLA  (place your signature on the first page only)    -From your Inbasket, Highlight the patient and click Chart   -Double click the 08/20/24 Forms Completion telephone encounter  -Scroll down to the Media section   -Click the blue Hyperlink: Family Medical Leave Act Dr Pineda/ Arturo Rivera   -Click Acknowledge located in the top right ribbon/menu   -Drag the mouse into the blank space of the document and a + sign will appear. Left click to   electronically sign the document.     Thank you,     Elvis

## 2024-08-22 NOTE — TELEPHONE ENCOUNTER
Dorms completed and [employer] message sent to patient. No valid Release of Information in file.

## 2024-08-26 ENCOUNTER — TELEPHONE (OUTPATIENT)
Dept: FAMILY MEDICINE CLINIC | Facility: CLINIC | Age: 54
End: 2024-08-26

## 2024-08-26 NOTE — TELEPHONE ENCOUNTER
[facility] via [facility] for the following concern:    Needs help with stress , depression and anxiety to referring to a psychologist.

## 2024-08-28 ENCOUNTER — TELEPHONE (OUTPATIENT)
Dept: SURGERY | Facility: CLINIC | Age: 54
End: 2024-08-28

## 2024-08-28 NOTE — TELEPHONE ENCOUNTER
Received short term disability form via email. Sent my chart message for Release of Information needed. Logged for processing.

## 2024-08-29 ENCOUNTER — NURSE TRIAGE (OUTPATIENT)
Dept: FAMILY MEDICINE CLINIC | Facility: CLINIC | Age: 54
End: 2024-08-29

## 2024-08-29 NOTE — TELEPHONE ENCOUNTER
Action Requested: Summary for Provider     []  Critical Lab, Recommendations Needed  [] Need Additional Advice  []   FYI    []   Need Orders  [] Need Medications Sent to Pharmacy  []  Other     SUMMARY: Per protocol advised : Office visit within 3 days    Future Appointments   Date Time Provider Department Center   8/30/2024 10:20 AM Raman Sosa, DO [facility] [place]   9/3/2024 10:00 AM Adelso Gonzalez, DO [facility] [place]     Needs Faroese speaking provider       Reason for call: Depression  Onset: Data Unavailable    With Language Line  Ezequiel   ID # [id number]    Patient calling ( name and date of birth of patient verified ) states having symptoms of anxiety, depression, sleep issues  onset of a few weeks   after his surgery 8/7    Would like to discuss seeing a psychiatrist   Reviewed red flag symptoms to watch for of when to go to ER.     Care Advice    Patient/Caregiver understands and will follow care advice?: Yes, plans to follow advice           Depression-A-[place]  Edyta ERNST Thu Aug 29, 2024 12:21 PM      Disposition and First Aid       SEE IN OFFICE WITHIN 3 DAYS:   * You need to be examined.   * Let me give you an appointment.              Recent Death of a Loved One       CALL BACK IF:  * Sadness or depression symptoms persist over 2 weeks  * You want to talk with a counselor  * You feel like harming yourself  * You become worse              Depression and Treatment with Counseling       CALL BACK IF:  * You have more questions                           Patient verbalizes understanding and agrees with plan.         Reason for Disposition   Depression is worsening (e.g.,sleeping poorly, less able to do activities of daily living)    Protocols used: Depression-A-[place]

## 2024-09-05 NOTE — TELEPHONE ENCOUNTER
Dr. Pineda      Please sign off on form if you agree to: Family Medical Leave Act Medical certification  (place your signature on the first page only)    -From your Inbasket, Highlight the patient and click Chart   -Double click the 08/28/24 Forms Completion telephone encounter  -Scroll down to the Media section   -Click the blue Hyperlink: Family Medical Leave Act Dr Pineda 09/05/24    -Click Acknowledge located in the top right ribbon/menu   -Drag the mouse into the blank space of the document and a + sign will appear. Left click to   electronically sign the document.     Thank you,    Tito'

## 2024-09-05 NOTE — TELEPHONE ENCOUNTER
Dr. Pineda,    Please sign off on form if you agree to:  Disability (surgery & recovery)    -Signature page will be the first page scanned  -From your Inbasket, Highlight the patient and click Chart   -Double click the 8/28/24 Forms Completion telephone encounter  -Scroll down to the Media section   -Click the blue Hyperlink:  Disability Dr. Pineda 9/5/24  -Click Acknowledge located in the top right ribbon/menu   -Drag the mouse into the blank space of the document and a + sign will appear. Left click to   electronically sign the document.  -Once signed, simply exit out of the screen and you signature will be saved.     Thank you,    Sameera

## 2024-09-10 NOTE — TELEPHONE ENCOUNTER
Form completed and [employer] message sent to patient. No valid Release of Information on file.

## 2024-09-11 NOTE — TELEPHONE ENCOUNTER
Disability forms completed & uploaded to patient's MyChart.    Unable to fax forms, no valid authorization on file.

## 2024-12-26 ENCOUNTER — OFFICE VISIT (OUTPATIENT)
Dept: FAMILY MEDICINE CLINIC | Facility: CLINIC | Age: 54
End: 2024-12-26
Payer: COMMERCIAL

## 2024-12-26 ENCOUNTER — LAB ENCOUNTER (OUTPATIENT)
Dept: LAB | Age: 54
End: 2024-12-26
Attending: STUDENT IN AN ORGANIZED HEALTH CARE EDUCATION/TRAINING PROGRAM
Payer: COMMERCIAL

## 2024-12-26 VITALS
SYSTOLIC BLOOD PRESSURE: 119 MMHG | DIASTOLIC BLOOD PRESSURE: 79 MMHG | HEIGHT: 69 IN | HEART RATE: 71 BPM | BODY MASS INDEX: 21.27 KG/M2 | WEIGHT: 143.63 LBS

## 2024-12-26 DIAGNOSIS — R63.4 RECENT UNINTENTIONAL WEIGHT LOSS OVER SEVERAL MONTHS: ICD-10-CM

## 2024-12-26 DIAGNOSIS — Z00.00 WELL ADULT EXAM: ICD-10-CM

## 2024-12-26 DIAGNOSIS — Z12.11 COLON CANCER SCREENING: ICD-10-CM

## 2024-12-26 DIAGNOSIS — Z00.00 WELL ADULT EXAM: Primary | ICD-10-CM

## 2024-12-26 LAB
ALBUMIN SERPL-MCNC: 5 G/DL (ref 3.2–4.8)
ALBUMIN/GLOB SERPL: 1.9 {RATIO} (ref 1–2)
ALP LIVER SERPL-CCNC: 89 U/L
ALT SERPL-CCNC: 24 U/L
ANION GAP SERPL CALC-SCNC: 6 MMOL/L (ref 0–18)
AST SERPL-CCNC: 20 U/L (ref ?–34)
BASOPHILS # BLD AUTO: 0.02 X10(3) UL (ref 0–0.2)
BASOPHILS NFR BLD AUTO: 0.3 %
BILIRUB SERPL-MCNC: 0.7 MG/DL (ref 0.3–1.2)
BILIRUB UR QL: NEGATIVE
BUN BLD-MCNC: 9 MG/DL (ref 9–23)
BUN/CREAT SERPL: 9.2 (ref 10–20)
CALCIUM BLD-MCNC: 10.1 MG/DL (ref 8.7–10.4)
CHLORIDE SERPL-SCNC: 105 MMOL/L (ref 98–112)
CHOLEST SERPL-MCNC: 173 MG/DL (ref ?–200)
CLARITY UR: CLEAR
CO2 SERPL-SCNC: 28 MMOL/L (ref 21–32)
COLOR UR: COLORLESS
COMPLEXED PSA SERPL-MCNC: 0.82 NG/ML (ref ?–4)
CREAT BLD-MCNC: 0.98 MG/DL
DEPRECATED RDW RBC AUTO: 42.1 FL (ref 35.1–46.3)
EGFRCR SERPLBLD CKD-EPI 2021: 93 ML/MIN/1.73M2 (ref 60–?)
EOSINOPHIL # BLD AUTO: 0.06 X10(3) UL (ref 0–0.7)
EOSINOPHIL NFR BLD AUTO: 0.9 %
ERYTHROCYTE [DISTWIDTH] IN BLOOD BY AUTOMATED COUNT: 12.7 % (ref 11–15)
FASTING PATIENT LIPID ANSWER: NO
FASTING STATUS PATIENT QL REPORTED: NO
GLOBULIN PLAS-MCNC: 2.6 G/DL (ref 2–3.5)
GLUCOSE BLD-MCNC: 101 MG/DL (ref 70–99)
GLUCOSE UR-MCNC: NORMAL MG/DL
HCT VFR BLD AUTO: 44.8 %
HDLC SERPL-MCNC: 70 MG/DL (ref 40–59)
HGB BLD-MCNC: 15.8 G/DL
HGB UR QL STRIP.AUTO: NEGATIVE
IMM GRANULOCYTES # BLD AUTO: 0.03 X10(3) UL (ref 0–1)
IMM GRANULOCYTES NFR BLD: 0.4 %
KETONES UR-MCNC: NEGATIVE MG/DL
LDLC SERPL CALC-MCNC: 89 MG/DL (ref ?–100)
LEUKOCYTE ESTERASE UR QL STRIP.AUTO: NEGATIVE
LYMPHOCYTES # BLD AUTO: 1.41 X10(3) UL (ref 1–4)
LYMPHOCYTES NFR BLD AUTO: 20.1 %
MCH RBC QN AUTO: 32.2 PG (ref 26–34)
MCHC RBC AUTO-ENTMCNC: 35.3 G/DL (ref 31–37)
MCV RBC AUTO: 91.4 FL
MONOCYTES # BLD AUTO: 0.38 X10(3) UL (ref 0.1–1)
MONOCYTES NFR BLD AUTO: 5.4 %
NEUTROPHILS # BLD AUTO: 5.12 X10 (3) UL (ref 1.5–7.7)
NEUTROPHILS # BLD AUTO: 5.12 X10(3) UL (ref 1.5–7.7)
NEUTROPHILS NFR BLD AUTO: 72.9 %
NITRITE UR QL STRIP.AUTO: NEGATIVE
NONHDLC SERPL-MCNC: 103 MG/DL (ref ?–130)
OSMOLALITY SERPL CALC.SUM OF ELEC: 287 MOSM/KG (ref 275–295)
PH UR: 5.5 [PH] (ref 5–8)
PLATELET # BLD AUTO: 264 10(3)UL (ref 150–450)
POTASSIUM SERPL-SCNC: 4.4 MMOL/L (ref 3.5–5.1)
PROT SERPL-MCNC: 7.6 G/DL (ref 5.7–8.2)
PROT UR-MCNC: NEGATIVE MG/DL
RBC # BLD AUTO: 4.9 X10(6)UL
SODIUM SERPL-SCNC: 139 MMOL/L (ref 136–145)
SP GR UR STRIP: 1.01 (ref 1–1.03)
TRIGL SERPL-MCNC: 77 MG/DL (ref 30–149)
TSI SER-ACNC: 1.87 UIU/ML (ref 0.55–4.78)
UROBILINOGEN UR STRIP-ACNC: NORMAL
VLDLC SERPL CALC-MCNC: 12 MG/DL (ref 0–30)
WBC # BLD AUTO: 7 X10(3) UL (ref 4–11)

## 2024-12-26 PROCEDURE — 99396 PREV VISIT EST AGE 40-64: CPT | Performed by: STUDENT IN AN ORGANIZED HEALTH CARE EDUCATION/TRAINING PROGRAM

## 2024-12-26 PROCEDURE — 3008F BODY MASS INDEX DOCD: CPT | Performed by: STUDENT IN AN ORGANIZED HEALTH CARE EDUCATION/TRAINING PROGRAM

## 2024-12-26 PROCEDURE — 81003 URINALYSIS AUTO W/O SCOPE: CPT

## 2024-12-26 PROCEDURE — 3078F DIAST BP <80 MM HG: CPT | Performed by: STUDENT IN AN ORGANIZED HEALTH CARE EDUCATION/TRAINING PROGRAM

## 2024-12-26 PROCEDURE — 85025 COMPLETE CBC W/AUTO DIFF WBC: CPT

## 2024-12-26 PROCEDURE — 84443 ASSAY THYROID STIM HORMONE: CPT

## 2024-12-26 PROCEDURE — 36415 COLL VENOUS BLD VENIPUNCTURE: CPT

## 2024-12-26 PROCEDURE — 80061 LIPID PANEL: CPT

## 2024-12-26 PROCEDURE — 3074F SYST BP LT 130 MM HG: CPT | Performed by: STUDENT IN AN ORGANIZED HEALTH CARE EDUCATION/TRAINING PROGRAM

## 2024-12-26 PROCEDURE — 80053 COMPREHEN METABOLIC PANEL: CPT

## 2024-12-26 NOTE — PROGRESS NOTES
HPI:    Patient ID: Dada Pratt is a 54 year old male.    HPI  Pt presenting for routine physical exam. No significant chronic medical problems. Past medical/surgical history, family history, and social history were reviewed.     S/p lap appy 8/6/2024  Reports appetite has recovered, energy levels good  Daily stools, compared to preop every other day BM  Admits to brief depression that has since resolved  Denies dyspepsia, N/V, cough, fever, night sweats    Reports unintentional weight loss for approx 6+ months    Complains of overnight awakenings but able to fall back asleep    Wt Readings from Last 6 Encounters:   12/26/24 143 lb 9.6 oz (65.1 kg)   08/20/24 157 lb (71.2 kg)   08/06/24 157 lb 10.1 oz (71.5 kg)   02/22/24 172 lb (78 kg)   07/12/23 163 lb 12.8 oz (74.3 kg)   05/22/23 159 lb 3.2 oz (72.2 kg)         Review of Systems   A comprehensive 10 point review of systems was completed.  Pertinent positives and negatives noted in the the HPI.       Current Outpatient Medications   Medication Sig Dispense Refill    traMADol 50 MG Oral Tab Take 1 tablet (50 mg total) by mouth every 6 (six) hours as needed for Pain. 30 tablet 1    escitalopram 10 MG Oral Tab Take 1 tablet (10 mg total) by mouth daily. (Patient not taking: Reported on 12/26/2024) 30 tablet 2     Allergies:Allergies[1]   Vitals:    12/26/24 1616   BP: 119/79   Pulse: 71   Weight: 143 lb 9.6 oz (65.1 kg)   Height: 5' 9\" (1.753 m)       Body mass index is 21.21 kg/m².   PHYSICAL EXAM:   Physical Exam  Vitals reviewed.   Constitutional:       General: He is not in acute distress.     Appearance: Normal appearance.   HENT:      Head: Normocephalic and atraumatic.      Right Ear: External ear normal.      Left Ear: External ear normal.   Eyes:      Conjunctiva/sclera: Conjunctivae normal.   Cardiovascular:      Rate and Rhythm: Normal rate and regular rhythm.      Heart sounds: Normal heart sounds, S1 normal and S2 normal. No murmur heard.  Pulmonary:       Effort: Pulmonary effort is normal. No respiratory distress.      Breath sounds: Normal breath sounds. No wheezing, rhonchi or rales.   Abdominal:      General: Bowel sounds are normal.      Palpations: Abdomen is soft.      Tenderness: There is no abdominal tenderness. There is no guarding or rebound.   Musculoskeletal:      Cervical back: Normal range of motion and neck supple. No muscular tenderness.      Right lower leg: No edema.      Left lower leg: No edema.   Lymphadenopathy:      Cervical: No cervical adenopathy.   Skin:     General: Skin is warm.      Coloration: Skin is not jaundiced.   Neurological:      General: No focal deficit present.      Mental Status: He is alert and oriented to person, place, and time. Mental status is at baseline.   Psychiatric:         Attention and Perception: Attention normal.         Mood and Affect: Mood normal.         Behavior: Behavior normal. Behavior is cooperative.         Cognition and Memory: Cognition normal.             ASSESSMENT/PLAN:   1. Well adult exam  Discussed preventative screenings  - shared decision making: pt agreeable to PSA  - colonoscopy discussed  - will check labs as below  - encouraged to continue diet/exercise for overall wellness  - follow-up with eye doctor annually  - follow-up with dentist every 6 months  - return yearly for physicals  - annual flu shot  - tetanus booster every 10yrs  - TSH W Reflex To Free T4; Future  - Comp Metabolic Panel (14); Future  - Lipid Panel; Future  - Urinalysis, Routine; Future  - CBC With Differential With Platelet; Future  - PSA Total, Screen; Future    2. Recent unintentional weight loss over several months  Discussed DDx  Will check labs  Anticipate GI eval for colon screening  - discussed red flags for urgent reevaluation  - to call with any questions/concerns  - Gastro Referral - [place] ([facility])  - XR CHEST PA + LAT CHEST (CPT=71046); Future    3. Colon cancer screening  - Gastro Referral -  [place] ([facility])    Pt verbalized understanding and agrees with plan.    Orders Placed This Encounter   Procedures    TSH W Reflex To Free T4    Comp Metabolic Panel (14)    Lipid Panel    Urinalysis, Routine    CBC With Differential With Platelet    PSA Total, Screen       Meds This Visit:  Requested Prescriptions      No prescriptions requested or ordered in this encounter       Imaging & Referrals:  GASTRO - INTERNAL  XR CHEST PA + LAT CHEST (CPT=71046)         ID#[id number]       [1] No Known Allergies

## 2024-12-27 NOTE — TELEPHONE ENCOUNTER
Language line interpretor #[id number]  TCB    CSS: if patient returns call, please assist with scheduling patient with first available.    Thank you

## 2024-12-27 NOTE — TELEPHONE ENCOUNTER
----- Message from Becky Anand sent at 12/27/2024  8:03 AM CST -----  Pt with unintentional weight loss approx 20lbs over last 3 months following lap appy Aug 2024.  No prior colon screening -- can you assist with timely scheduling?

## (undated) DEVICE — ARTICULATION RELOAD WITH TRI-STAPLE TECHNOLOGY: Brand: ENDO GIA

## (undated) DEVICE — UNIVERSAL STAPLER: Brand: ENDO GIA ULTRA

## (undated) DEVICE — LAP CHOLE: Brand: MEDLINE INDUSTRIES, INC.

## (undated) DEVICE — ELECTRODE ES 2.75IN PTFE BLDE MOD E-Z CLN

## (undated) DEVICE — TROCAR: Brand: KII FIOS FIRST ENTRY

## (undated) DEVICE — DEVICE LAP SUT PRT 150MM G 14GA TWO TRCR DEV

## (undated) DEVICE — SUT COAT VCRL+ 0 27IN UR-6 ABSRB VLT ANTIBACT

## (undated) DEVICE — [HIGH FLOW INSUFFLATOR,  DO NOT USE IF PACKAGE IS DAMAGED,  KEEP DRY,  KEEP AWAY FROM SUNLIGHT,  PROTECT FROM HEAT AND RADIOACTIVE SOURCES.]: Brand: PNEUMOSURE

## (undated) DEVICE — TISSUE RETRIEVAL SYSTEM: Brand: INZII RETRIEVAL SYSTEM

## (undated) DEVICE — BINDER ABD L/XL H12XL62IN 4 PNL UNIV PREM

## (undated) DEVICE — TROCAR: Brand: KII® SLEEVE

## (undated) DEVICE — SUT MCRYL 4-0 18IN PS-2 ABSRB UD 19MM 3/8 CIR

## (undated) DEVICE — SOLUTION IRRIG 1000ML 0.9% NACL USP BTL

## (undated) DEVICE — GAMMEX® PI HYBRID SIZE 7.5, STERILE POWDER-FREE SURGICAL GLOVE, POLYISOPRENE AND NEOPRENE BLEND: Brand: GAMMEX

## (undated) DEVICE — TROCARS: Brand: KII® BALLOON BLUNT TIP SYSTEM

## (undated) NOTE — LETTER
8/12/2024          To Whom It May Concern:    Dada Pratt is currently under my medical care and may not return to work at this time.  .  He may return to work on August 19, 2024..  Activity is restricted as follows: light duty until September 2, 2024.  If you require additional information please contact our office.        Sincerely,            Antonio Pineda MD          Document generated by:  SINAI GRAY

## (undated) NOTE — LETTER
[facility]  Administration of Anesthesia  Dada GARCIA agree to be cared for by a physician anesthesiologist alone and/or with a nurse anesthetist, who is specially trained to monitor me and give me medicine to put me to sleep or keep me comfortable during my procedure    I understand that my anesthesiologist and/or anesthetist is not an employee or agent of [facility] or [employer]. He or she works for [facility]    As the patient asking for anesthesia services, I agree to:  Allow the anesthesiologist (anesthesia doctor) to give me medicine and do additional procedures as necessary. Some examples are: Starting or using an “IV” to give me medicine, fluids or blood during my procedure, and having a breathing tube placed to help me breathe when I’m asleep (intubation). In the event that my heart stops working properly, I understand that my anesthesiologist will make every effort to sustain my life, unless otherwise directed by [facility] Do Not Resuscitate documents.  Tell my anesthesia doctor before my procedure:  If I am pregnant.  The last time that I ate or drank.  iii. All of the medicines I take (including prescriptions, herbal supplements, and pills I can buy without a prescription (including street drugs/illegal medications). Failure to inform my anesthesiologist about these medicines may increase my risk of anesthetic complications.  iv.If I am allergic to anything or have had a reaction to anesthesia before.  I understand how the anesthesia medicine will help me (benefits).  I understand that with any type of anesthesia medicine there are risks:  The most common risks are: nausea, vomiting, sore throat, muscle soreness, damage to my eyes, mouth, or teeth (from breathing tube placement).  Rare risks include: remembering what happened during my procedure, allergic reactions to medications, injury to my airway, heart, lungs, vision, nerves, or muscles  and in extremely rare instances death.  My doctor has explained to me other choices available to me for my care (alternatives).  Pregnant Patients (“epidural”):  I understand that the risks of having an epidural (medicine given into my back to help control pain during labor), include itching, low blood pressure, difficulty urinating, headache or slowing of the baby’s heart. Very rare risks include infection, bleeding, seizure, irregular heart rhythms and nerve injury.  Regional Anesthesia (“spinal”, “epidural”, & “nerve blocks”):  I understand that rare but potential complications include headache, bleeding, infection, seizure, irregular heart rhythms, and nerve injury.    _____________________________________________________________________________  Patient (or Representative) Signature/Relationship to Patient  Date   Time    _____________________________________________________________________________   Name (if used)    Language/Organization   Time    _____________________________________________________________________________  Nurse Anesthetist Signature     Date   Time  _____________________________________________________________________________  Anesthesiologist Signature     Date   Time  I have discussed the procedure and information above with the patient (or patient’s representative) and answered their questions. The patient or their representative has agreed to have anesthesia services.    _____________________________________________________________________________  Witness        Date   Time  I have verified that the signature is that of the patient or patient’s representative, and that it was signed before the procedure  Patient Name: Dada Pratt     : 1970                 Printed: 2024 at 8:26 AM    Medical Record #: [id number]                                            Page 1 of 1  ----------ANESTHESIA CONSENT----------

## (undated) NOTE — LETTER
8/29/2024          Dada Pratt        [facility] [address]         Estimado/a Dada,    Le enviamos esta carta para informarle que nuestra oficina eisenberg intentado ponerse en contacto con usted por teléfono sin éxito.   Por favor, comuníquese con nuestra oficina llamando al número ubicado debajo para hablar sobre valorie sixto y para realizar los cambios necesarios a celaya información de contacto en nuestro sistema. Alexi por celaya ayuda.    Sinceramente,    Adelso Gonzalez, DO  [address] [facility] [id number] [phone]        Document electronically generated by:  Lissy WATSON RN

## (undated) NOTE — LETTER
[facility] [address], [place], [place]    Authorization for Surgical Operation and Procedure                               I hereby authorize Antonio Pineda MD, my physician and his/her assistants (if applicable), which may include medical students, residents, and/or fellows, to perform the following surgical operation/ procedure and administer such anesthesia as may be determined necessary by my physician: Operation/Procedure name (s) LAPAROSCOPIC APPENDECTOMY, POSSIBLE OPEN on Dada Pratt   2.   I recognize that during the surgical operation/procedure, unforeseen conditions may necessitate additional or different procedures than those listed above.  I, therefore, further authorize and request that the above-named surgeon, assistants, or designees perform such procedures as are, in their judgment, necessary and desirable.    3.   My surgeon/physician has discussed prior to my surgery the potential benefits, risks and side effects of this procedure; the likelihood of achieving goals; and potential problems that might occur during recuperation.  They also discussed reasonable alternatives to the procedure, including risks, benefits, and side effects related to the alternatives and risks related to not receiving this procedure.  I have had all my questions answered and I acknowledge that no guarantee has been made as to the result that may be obtained.    4.   Should the need arise during my operation/procedure, which includes change of level of care prior to discharge, I also consent to the administration of blood and/or blood products.  Further, I understand that despite careful testing and screening of blood or blood products by collecting agencies, I may still be subject to ill effects as a result of receiving a blood transfusion and/or blood products.  The following are some, but not all, of the potential risks that can occur: fever and allergic reactions, hemolytic reactions, transmission of  diseases such as Hepatitis, AIDS and Cytomegalovirus (CMV) and fluid overload.  In the event that I wish to have an autologous transfusion of my own blood, or a directed donor transfusion, I will discuss this with my physician.  Check only if Refusing Blood or Blood Products  I understand refusal of blood or blood products as deemed necessary by my physician may have serious consequences to my condition to include possible death. I hereby assume responsibility for my refusal and release the hospital, its personnel, and my physicians from any responsibility for the consequences of my refusal.    o  Refuse   5.   I authorize the use of any specimen, organs, tissues, body parts or foreign objects that may be removed from my body during the operation/procedure for diagnosis, research or teaching purposes and their subsequent disposal by hospital authorities.  I also authorize the release of specimen test results and/or written reports to my treating physician on the hospital medical staff or other referring or consulting physicians involved in my care, at the discretion of the Pathologist or my treating physician.    6.   I consent to the photographing or videotaping of the operations or procedures to be performed, including appropriate portions of my body for medical, scientific, or educational purposes, provided my identity is not revealed by the pictures or by descriptive texts accompanying them.  If the procedure has been photographed/videotaped, the surgeon will obtain the original picture, image, videotape or CD.  The hospital will not be responsible for storage, release or maintenance of the picture, image, tape or CD.    7.   I consent to the presence of a  or observers in the operating room as deemed necessary by my physician or their designees.    8.   I recognize that in the event my procedure results in extended X-Ray/fluoroscopy time, I may develop a skin reaction.    9. If I have a Do Not  Attempt Resuscitation (DNAR) order in place, that status will be suspended while in the operating room, procedural suite, and during the recovery period unless otherwise explicitly stated by me (or a person authorized to consent on my behalf). The surgeon or my attending physician will determine when the applicable recovery period ends for purposes of reinstating the DNAR order.  10. Patients having a sterilization procedure: I understand that if the procedure is successful the results will be permanent and it will therefore be impossible for me to inseminate, conceive, or bear children.  I also understand that the procedure is intended to result in sterility, although the result has not been guaranteed.   11. I acknowledge that my physician has explained sedation/analgesia administration to me including the risk and benefits I consent to the administration of sedation/analgesia as may be necessary or desirable in the judgment of my physician.    I CERTIFY THAT I HAVE READ AND FULLY UNDERSTAND THE ABOVE CONSENT TO OPERATION and/or OTHER PROCEDURE.     ____________________________________  _________________________________        ______________________________  Signature of Patient    Signature of Responsible Person                Printed Name of Responsible Person                                      ____________________________________  _____________________________                ________________________________  Signature of Witness        Date  Time         Relationship to Patient    STATEMENT OF PHYSICIAN My signature below affirms that prior to the time of the procedure; I have explained to the patient and/or his/her legal representative, the risks and benefits involved in the proposed treatment and any reasonable alternative to the proposed treatment. I have also explained the risks and benefits involved in refusal of the proposed treatment and alternatives to the proposed treatment and have answered the  patient's questions. If I have a significant financial interest in a co-management agreement or a significant financial interest in any product or implant, or other significant relationship used in this procedure/surgery, I have disclosed this and had a discussion with my patient.     _____________________________________________________              _____________________________  (Signature of Physician)                                                                                         (Date)                                   (Time)  Patient Name: Dada Pratt      : 1970      Printed: 2024     Medical Record #: [id number]                                      Page 1 of 1

## (undated) NOTE — LETTER
3/27/2023              Elisa Menendez        [address] [address]         To Whom It May Concern,    Elisa Menendez was seen and evaluated in our office on 3/27/2023. Due to his current symptoms, please allow for the following work restrictions:  - lifting limited to 15lbs  - avoid repetitive lifting/bending  He will be reevaluated within 6 weeks to determine his safe return to work without restrictions. If you require any more information, please contact our office.       Sincerely,      Zoey Crook MD  [facility], [address] [address] [phone]        Document electronically generated by:  Zoey Crook MD

## (undated) NOTE — LETTER
5/22/2023              Eric Stewart        [address] [address]         To Whom It May Concern:    Eric Stewart is under my medical care. Due to his clinical improvement, he may return to work without restriction at this time. If you require any more information, please contact our office.         Sincerely,      Johnson Tovar MD  [facility], [address] [address] [phone]        Document electronically generated by:  Johnson Tovar MD

## (undated) NOTE — LETTER
7/12/2023              Fidel Salter        [address] [address]         To Whom It May Concern:    Fidel Salter is under my medical care. He was seen and evaluated in our office on 7/12/2023. Due to his current symptoms, please allow for the following work restrictions:  - light duty  - lifting limited to less than 15lbs  He was be reevaluated by 7/21/2023 to determine his return to work without restriction. If you require any more information, please contact our office.       Sincerely,    Shonna Rodriguez MD  [facility], [address] [address] [phone]        Document electronically generated by:  Shonna Rodriguez MD

## (undated) NOTE — LETTER
Patient Name: Dada Pratt : 1970  Medical Record #: [id number] Printed: 2024  Page 1 of 2                                          [facility]  [address], [place], [place]  Autorización para operación y procedimiento quirúrgico                                                                                                      Por la presente, autorizo a Antonio Pineda MD, mi médico y al asistente a realizar la operación/procedimiento quirúrgico a continuación, así boni a administrar la anestesia que determine necesaria mi médico Nombre (s) de la operación/procedimiento: LAPAROSCOPIC APPENDECTOMY, POSSIBLE OPEN en Dada Pratt     Reconozco que perez la operación/procedimiento quirúrgico, las condiciones imprevistas pueden requerir de procedimientos adicionales o diferentes a aquellos mencionados anteriormente.  Por lo tanto, autorizo y solicito además que el cirujano antes mencionado, los asistentes o las personas designadas realicen los procedimientos que, a celaya juicio, james necesarios y convenientes.    Mi cirujano/médico eisenberg discutido antes de mi cirugía los posibles beneficios, riesgos y efectos secundarios de valorie procedimiento, la probabilidad de alcanzar las metas y los posibles problemas que puedan ocurrir perez la recuperación.  Ellos también day discutido las alternativas razonables al procedimiento, incluso los riesgos, beneficios y efectos secundarios relacionados con las alternativas y los riesgos relacionados con no realizar valorie procedimiento.  Day respondido a todas mis preguntas y confirmo que no se ha dado ninguna garantía en cuanto a los resultados que pueda obtener.    En lorenzo de que surja la necesidad perez mi operación o perez el periodo postoperatorio, también autorizo se aplique virgilio y/o productos sanguíneos.  Asimismo, entiendo que a pesar de las cuidadosas pruebas y análisis de virgilio o de los productos sanguíneos que realizan las entidades  recolectoras, todavía puedo estar sujeto a efectos adversos boni resultado de recibir orin transfusión de virgilio y/o productos sanguíneos.  A continuación se mencionan algunos, aunque no todos, los riesgos potenciales que pueden ocurrir: fiebre y reacciones alérgicas, reacciones hemolíticas, trasmisión de enfermedades boni hepatitis, SIDA y citomegalovirus (CMV), así boni sobrecarga de líquidos.   En lorenzo de que desee tener orin transfusión autóloga de mi propia virgilio o orin transfusión de un donante dirigido.  Lo discutiré con mi médico.  Check only if Refusing Blood or Blood Products  I understand refusal of blood or blood products as deemed necessary by my physician may have serious consequences to my condition to include possible death. I hereby assume responsibility for my refusal and release the hospital, its personnel, and my physicians from any responsibility for the consequences of my refusal.           o  Refuse       Autorizo el uso de cualquier muestra, órgano, tejido, parte del cuerpo u objeto extraño que pueda ser extraído de mi cuerpo perez la operación/procedimiento para fines de diagnóstico, investigación o de enseñanza y celaya desecho posterior por las autoridades del hospital.  También, autorizo la revelación de los resultados de las pruebas de muestras y/o los informes escritos al médico tratante boni personal médico del hospital u otros médicos de referencia o consulta involucrados en mi atención, a discreción del patólogo o de mi médico tratante.    Doy consentimiento para que se fotografíen o graben vídeos de las operaciones o procedimientos a realizarse, incluidas las partes de mi cuerpo que james adecuadas para propósitos médicos, científicos o educativos, en el entendido de que, mi identidad no sea revelada por las fotografías o por textos descriptivos que las acompañen.  Si el procedimiento es fotografiado o grabado en vídeo, el cirujano obtendrá la imagen, cinta de vídeo o CD original.  El  hospital no se hará responsable por el almacenamiento, la revelación o el mantenimiento de la imagen, cinta o CD.    Autorizo la presencia de un especialista de producto u observadores en el quirófano, según lo considere necesario mi médico o las personas que éste designe.     Reconozco que en lorenzo de que mi procedimiento resulte en un tiempo prolongado de radiografía/fluoroscopia, puedo desarrollar orin reacción en la piel.    Si tengo orin orden de No intentar la reanimación (MINOR), ezekiel estado se suspenderá mientras esté en el quirófano, la val de procedimientos y perez el periodo de recuperación a menos que yo indique lo contrario explícitamente (o orin persona autorizada a gomez el consentimiento en mi nombre). El cirujano o mi médico tratante determinarán cuándo termina el periodo de recuperación aplicable a los efectos de restablecer la orden de MINOR.  Pacientes que se realizan un procedimiento de esterilización: Entiendo que si el procedimiento tiene éxito, los resultados serán permanentes y que, por lo tanto, me será imposible inseminar, concebir o tener hijos.  Asimismo, entiendo que el procedimiento tiene boni propósito la esterilidad, aunque el resultado no está garantizado.   Admito que mi médico me ha explicado la aplicación de sedación/analgesia, incluidos los riesgos y beneficios y, consiento a la administración de sedación/analgesia conforme sea necesario o conveniente a juicio de mi médico.      CERTIFICO QUE HE LEÍDO Y COMPRENDIDO EL CONSENTIMIENTO ANTERIOR PARA LA OPERACIÓN y/o PROCEDIMIENTO.             ______________________________________  _______________________________  Firma del paciente      Firma de la persona responsible  Signature of patient      Signature of responsible person                        ___________________________________                                   Nombre en imprenta de la persona responsible         Name of responsible  person          ___________________________________                 Relación con el paciente         Relationship to patient    _________________________________________  ______________ ________________  Firma del testigo          Fecha / Date Hora / Time  Signature of witness    DECLARACÓN DEL MÉDICO Mediante mi firma al calce, afirmo que antes de la hora del procedimiento, he explicadoal paciente y/o a celaya representante legal, los riesgos y beneficios involucrados en el tratamiento propuesto, así comocualquier alternativa razonable al tratamiento propuesto. También le(s) he explicado los riesgos y beneficios involucradosen el rechazo del tratarniento propuesto y alternativas al tratamiento propuesto, y he respondido a las preguntas del(la) paciente(My signature below affirms that prior to the time of the procedure, I have explained to the patient and/or his/her guardian, therisks and benefits involved in the proposed treatment and any reasonable alternative to the proposed treatment. I have alsoexplained the risks and benefits involved in refusal of the proposed treatment and have answered the patient's questions.)    ________________________________________   _________________________   _____________   (Firma del médico/Signature of Physician)                                    (Fecha/Date)                                             (Hora/Time)      Patient Name: Dada Pratt     : 1970                 Printed: 2024      Medical Record #: [id number]                                              Page 2 of 2